# Patient Record
Sex: MALE | Race: WHITE | Employment: OTHER | URBAN - METROPOLITAN AREA
[De-identification: names, ages, dates, MRNs, and addresses within clinical notes are randomized per-mention and may not be internally consistent; named-entity substitution may affect disease eponyms.]

---

## 2020-01-07 ENCOUNTER — APPOINTMENT (OUTPATIENT)
Dept: GENERAL RADIOLOGY | Age: 60
End: 2020-01-07
Payer: MEDICARE

## 2020-01-07 ENCOUNTER — APPOINTMENT (OUTPATIENT)
Dept: CT IMAGING | Age: 60
End: 2020-01-07
Payer: MEDICARE

## 2020-01-07 ENCOUNTER — HOSPITAL ENCOUNTER (EMERGENCY)
Age: 60
Discharge: ANOTHER HOSPITAL | End: 2020-01-07
Attending: EMERGENCY MEDICINE
Payer: MEDICARE

## 2020-01-07 ENCOUNTER — HOSPITAL ENCOUNTER (EMERGENCY)
Dept: HOSPITAL 83 - ED | Age: 60
Discharge: ANOTHER HOSPITAL | End: 2020-01-07
Payer: MEDICARE

## 2020-01-07 VITALS — HEIGHT: 73.98 IN | WEIGHT: 208 LBS | BODY MASS INDEX: 26.69 KG/M2

## 2020-01-07 VITALS
HEART RATE: 82 BPM | OXYGEN SATURATION: 96 % | DIASTOLIC BLOOD PRESSURE: 74 MMHG | SYSTOLIC BLOOD PRESSURE: 120 MMHG | RESPIRATION RATE: 18 BRPM | HEIGHT: 74 IN | WEIGHT: 218 LBS | BODY MASS INDEX: 27.98 KG/M2 | TEMPERATURE: 99.1 F

## 2020-01-07 VITALS — DIASTOLIC BLOOD PRESSURE: 56 MMHG

## 2020-01-07 VITALS — DIASTOLIC BLOOD PRESSURE: 60 MMHG

## 2020-01-07 VITALS — SYSTOLIC BLOOD PRESSURE: 104 MMHG | DIASTOLIC BLOOD PRESSURE: 57 MMHG

## 2020-01-07 VITALS — DIASTOLIC BLOOD PRESSURE: 54 MMHG | SYSTOLIC BLOOD PRESSURE: 111 MMHG

## 2020-01-07 VITALS — DIASTOLIC BLOOD PRESSURE: 52 MMHG

## 2020-01-07 VITALS — DIASTOLIC BLOOD PRESSURE: 56 MMHG | SYSTOLIC BLOOD PRESSURE: 107 MMHG

## 2020-01-07 DIAGNOSIS — E78.5: ICD-10-CM

## 2020-01-07 DIAGNOSIS — F10.121: ICD-10-CM

## 2020-01-07 DIAGNOSIS — D64.9: ICD-10-CM

## 2020-01-07 DIAGNOSIS — D68.9: ICD-10-CM
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
## 2020-01-07 DIAGNOSIS — I10: ICD-10-CM

## 2020-01-07 DIAGNOSIS — K92.2: Primary | ICD-10-CM

## 2020-01-07 LAB
ABO/RH: NORMAL
ALBUMIN SERPL-MCNC: 2.4 GM/DL (ref 3.1–4.5)
ALBUMIN SERPL-MCNC: 2.9 G/DL (ref 3.5–5.2)
ALP BLD-CCNC: 69 U/L (ref 40–129)
ALP SERPL-CCNC: 86 U/L (ref 45–117)
ALT SERPL W P-5'-P-CCNC: 43 U/L (ref 12–78)
ALT SERPL-CCNC: 40 U/L (ref 0–40)
AMMONIA: 46 UMOL/L (ref 16–60)
ANION GAP SERPL CALCULATED.3IONS-SCNC: 12 MMOL/L (ref 7–16)
ANISOCYTOSIS: ABNORMAL
ANTIBODY SCREEN: NORMAL
APTT: 37.8 SEC (ref 24.5–35.1)
AST SERPL-CCNC: 111 IU/L (ref 3–35)
AST SERPL-CCNC: 130 U/L (ref 0–39)
BASO STIPL BLD QL SMEAR: SLIGHT
BASO STIPL BLD QL SMEAR: SLIGHT
BASOPHILS # BLD AUTO: 1 % (ref 0–1)
BASOPHILS # BLD AUTO: 2 % (ref 0–1)
BASOPHILS ABSOLUTE: 0 E9/L (ref 0–0.2)
BASOPHILS RELATIVE PERCENT: 0 % (ref 0–2)
BILIRUB SERPL-MCNC: 6.7 MG/DL (ref 0–1.2)
BILIRUBIN DIRECT: 2.3 MG/DL (ref 0–0.3)
BILIRUBIN, INDIRECT: 4.4 MG/DL (ref 0–1)
BLOOD BANK DISPENSE STATUS: NORMAL
BLOOD BANK PRODUCT CODE: NORMAL
BPU ID: NORMAL
BUN BLDV-MCNC: 56 MG/DL (ref 6–20)
BUN SERPL-MCNC: 57 MG/DL (ref 7–24)
CALCIUM SERPL-MCNC: 8.6 MG/DL (ref 8.6–10.2)
CHLORIDE BLD-SCNC: 98 MMOL/L (ref 98–107)
CHLORIDE SERPL-SCNC: 101 MMOL/L (ref 98–107)
CK SERPL-CCNC: 87 U/L (ref 39–308)
CO2: 23 MMOL/L (ref 22–29)
CREAT SERPL-MCNC: 2.2 MG/DL (ref 0.7–1.2)
CREAT SERPL-MCNC: 2.73 MG/DL (ref 0.7–1.3)
DESCRIPTION BLOOD BANK: NORMAL
EKG ATRIAL RATE: 86 BPM
EKG P AXIS: 69 DEGREES
EKG P-R INTERVAL: 188 MS
EKG Q-T INTERVAL: 382 MS
EKG QRS DURATION: 92 MS
EKG QTC CALCULATION (BAZETT): 457 MS
EKG R AXIS: 34 DEGREES
EKG T AXIS: 69 DEGREES
EKG VENTRICULAR RATE: 86 BPM
EOSINOPHILS ABSOLUTE: 0.17 E9/L (ref 0.05–0.5)
EOSINOPHILS RELATIVE PERCENT: 3 % (ref 0–6)
ERYTHROCYTE [DISTWIDTH] IN BLOOD BY AUTOMATED COUNT: 20 % (ref 0–14.5)
ERYTHROCYTE [DISTWIDTH] IN BLOOD BY AUTOMATED COUNT: 20 % (ref 0–14.5)
ETHANOL SERPL-MCNC: 277 MG/DL (ref ?–3)
ETHANOL: 209 MG/DL (ref 0–0.08)
GFR AFRICAN AMERICAN: 37
GFR NON-AFRICAN AMERICAN: 31 ML/MIN/1.73
GLUCOSE BLD-MCNC: 187 MG/DL (ref 74–99)
HCT VFR BLD AUTO: 12.7 % (ref 42–52)
HCT VFR BLD AUTO: 13.3 % (ref 42–52)
HCT VFR BLD CALC: 16.2 % (ref 37–54)
HEMOGLOBIN: 5.1 G/DL (ref 12.5–16.5)
HGB BLD-MCNC: 3.9 G/DL (ref 14–18)
HGB BLD-MCNC: 4.2 G/DL (ref 14–18)
HOWELL-JOLLY BOD BLD QL SMEAR: (no result)
HYPOCHROMIA: ABNORMAL
INR BLD: 2.1 (ref 2–3.5)
INR BLD: 2.4
LIPASE SERPL-CCNC: 1069 U/L (ref 73–393)
LYMPHOCYTES ABSOLUTE: 0.45 E9/L (ref 1.5–4)
LYMPHOCYTES RELATIVE PERCENT: 8 % (ref 20–42)
MACROCYTES BLD QL SMEAR: (no result)
MACROCYTES BLD QL SMEAR: (no result)
MCH RBC QN AUTO: 40.5 PG (ref 26–35)
MCH RBC QN AUTO: 44.3 PG (ref 27–31)
MCH RBC QN AUTO: 45.7 PG (ref 27–31)
MCHC RBC AUTO-ENTMCNC: 30.7 G/DL (ref 33–37)
MCHC RBC AUTO-ENTMCNC: 31.5 % (ref 32–34.5)
MCHC RBC AUTO-ENTMCNC: 31.6 G/DL (ref 33–37)
MCV RBC AUTO: 128.6 FL (ref 80–99.9)
MCV RBC AUTO: 144.3 FL (ref 80–94)
MCV RBC AUTO: 144.6 FL (ref 80–94)
METAMYELOCYTES RELATIVE PERCENT: 2 % (ref 0–1)
MONOCYTES ABSOLUTE: 0.39 E9/L (ref 0.1–0.95)
MONOCYTES RELATIVE PERCENT: 7 % (ref 2–12)
MYELOCYTE PERCENT: 1 % (ref 0–0)
MYOGLOBIN SERPL-MCNC: 285 NG/ML (ref 16–116)
NEUTROPHILS ABSOLUTE: 4.59 E9/L (ref 1.8–7.3)
NEUTROPHILS RELATIVE PERCENT: 79 % (ref 43–80)
NRBC BLD QL AUTO: 0.1 10*3/UL (ref 0–0)
NRBC BLD QL AUTO: 0.1 10*3/UL (ref 0–0)
NUCLEATED RED BLOOD CELLS: 1 /100 WBC
OVALOCYTES: ABNORMAL
PDW BLD-RTO: ABNORMAL FL (ref 11.5–15)
PLATELET # BLD AUTO: 79 10*3/UL (ref 130–400)
PLATELET # BLD AUTO: 88 10*3/UL (ref 130–400)
PLATELET # BLD: 73 E9/L (ref 130–450)
PLATELET CONFIRMATION: NORMAL
PLATELET SUFFICIENCY: (no result)
PLATELET SUFFICIENCY: (no result)
PMV BLD AUTO: 12.5 FL (ref 7–12)
PMV BLD AUTO: 12.7 FL (ref 9.6–12.3)
PMV BLD AUTO: 12.9 FL (ref 9.6–12.3)
POIKILOCYTES: ABNORMAL
POLYCHROMASIA BLD QL SMEAR: SLIGHT
POLYCHROMASIA BLD QL SMEAR: SLIGHT
POLYCHROMASIA: ABNORMAL
POTASSIUM SERPL-SCNC: 4.8 MMOL/L (ref 3.5–5.1)
POTASSIUM SERPL-SCNC: 5.9 MMOL/L (ref 3.5–5)
PROT SERPL-MCNC: 8.3 GM/DL (ref 6.4–8.2)
PROTHROMBIN TIME: 26.8 SEC (ref 9.3–12.4)
RBC # BLD AUTO: 0.88 10*6/UL (ref 4.5–5.9)
RBC # BLD AUTO: 0.92 10*6/UL (ref 4.5–5.9)
RBC # BLD: 1.26 E12/L (ref 3.8–5.8)
ROULEAUX BLD QL SMEAR: (no result)
ROULEAUX BLD QL SMEAR: (no result)
SODIUM BLD-SCNC: 133 MMOL/L (ref 132–146)
SODIUM SERPL-SCNC: 133 MMOL/L (ref 136–145)
TARGETS BLD QL SMEAR: (no result)
TARGETS BLD QL SMEAR: (no result)
TOTAL CELLS COUNTED: 100 #CELLS
TOTAL CELLS COUNTED: 100 #CELLS
TOTAL PROTEIN: 8.9 G/DL (ref 6.4–8.3)
TROPONIN I SERPL-MCNC: < 0.015 NG/ML (ref ?–0.04)
TROPONIN: 0.09 NG/ML (ref 0–0.03)
WBC # BLD: 5.6 E9/L (ref 4.5–11.5)
WBC NRBC COR # BLD AUTO: 6.8 10*3/UL (ref 4.8–10.8)
WBC NRBC COR # BLD AUTO: 7.7 10*3/UL (ref 4.8–10.8)

## 2020-01-07 PROCEDURE — 93010 ELECTROCARDIOGRAM REPORT: CPT | Performed by: INTERNAL MEDICINE

## 2020-01-07 PROCEDURE — 70450 CT HEAD/BRAIN W/O DYE: CPT

## 2020-01-07 PROCEDURE — 99222 1ST HOSP IP/OBS MODERATE 55: CPT | Performed by: SURGERY

## 2020-01-07 PROCEDURE — 6360000002 HC RX W HCPCS: Performed by: STUDENT IN AN ORGANIZED HEALTH CARE EDUCATION/TRAINING PROGRAM

## 2020-01-07 PROCEDURE — G0480 DRUG TEST DEF 1-7 CLASSES: HCPCS

## 2020-01-07 PROCEDURE — 86923 COMPATIBILITY TEST ELECTRIC: CPT

## 2020-01-07 PROCEDURE — 80048 BASIC METABOLIC PNL TOTAL CA: CPT

## 2020-01-07 PROCEDURE — C9113 INJ PANTOPRAZOLE SODIUM, VIA: HCPCS | Performed by: EMERGENCY MEDICINE

## 2020-01-07 PROCEDURE — 36430 TRANSFUSION BLD/BLD COMPNT: CPT

## 2020-01-07 PROCEDURE — 6360000002 HC RX W HCPCS: Performed by: EMERGENCY MEDICINE

## 2020-01-07 PROCEDURE — 72125 CT NECK SPINE W/O DYE: CPT

## 2020-01-07 PROCEDURE — 2580000003 HC RX 258: Performed by: EMERGENCY MEDICINE

## 2020-01-07 PROCEDURE — 85025 COMPLETE CBC W/AUTO DIFF WBC: CPT

## 2020-01-07 PROCEDURE — 36415 COLL VENOUS BLD VENIPUNCTURE: CPT

## 2020-01-07 PROCEDURE — 86901 BLOOD TYPING SEROLOGIC RH(D): CPT

## 2020-01-07 PROCEDURE — 2580000003 HC RX 258: Performed by: STUDENT IN AN ORGANIZED HEALTH CARE EDUCATION/TRAINING PROGRAM

## 2020-01-07 PROCEDURE — 71045 X-RAY EXAM CHEST 1 VIEW: CPT

## 2020-01-07 PROCEDURE — 6360000002 HC RX W HCPCS: Performed by: SURGERY

## 2020-01-07 PROCEDURE — 93005 ELECTROCARDIOGRAM TRACING: CPT | Performed by: EMERGENCY MEDICINE

## 2020-01-07 PROCEDURE — 86850 RBC ANTIBODY SCREEN: CPT

## 2020-01-07 PROCEDURE — 84484 ASSAY OF TROPONIN QUANT: CPT

## 2020-01-07 PROCEDURE — 86900 BLOOD TYPING SEROLOGIC ABO: CPT

## 2020-01-07 PROCEDURE — 85610 PROTHROMBIN TIME: CPT

## 2020-01-07 PROCEDURE — 96368 THER/DIAG CONCURRENT INF: CPT

## 2020-01-07 PROCEDURE — 80076 HEPATIC FUNCTION PANEL: CPT

## 2020-01-07 PROCEDURE — C9132 KCENTRA, PER I.U.: HCPCS | Performed by: SURGERY

## 2020-01-07 PROCEDURE — P9016 RBC LEUKOCYTES REDUCED: HCPCS

## 2020-01-07 PROCEDURE — 82140 ASSAY OF AMMONIA: CPT

## 2020-01-07 PROCEDURE — 85730 THROMBOPLASTIN TIME PARTIAL: CPT

## 2020-01-07 PROCEDURE — 99285 EMERGENCY DEPT VISIT HI MDM: CPT

## 2020-01-07 PROCEDURE — 2580000003 HC RX 258: Performed by: SURGERY

## 2020-01-07 PROCEDURE — 72170 X-RAY EXAM OF PELVIS: CPT

## 2020-01-07 PROCEDURE — 96375 TX/PRO/DX INJ NEW DRUG ADDON: CPT

## 2020-01-07 PROCEDURE — 96365 THER/PROPH/DIAG IV INF INIT: CPT

## 2020-01-07 PROCEDURE — 96367 TX/PROPH/DG ADDL SEQ IV INF: CPT

## 2020-01-07 RX ORDER — 0.9 % SODIUM CHLORIDE 0.9 %
250 INTRAVENOUS SOLUTION INTRAVENOUS ONCE
Status: COMPLETED | OUTPATIENT
Start: 2020-01-07 | End: 2020-01-07

## 2020-01-07 RX ORDER — CYANOCOBALAMIN 1000 UG/ML
1000 INJECTION INTRAMUSCULAR; SUBCUTANEOUS
COMMUNITY

## 2020-01-07 RX ORDER — LEVETIRACETAM 5 MG/ML
500 INJECTION INTRAVASCULAR EVERY 12 HOURS
Status: DISCONTINUED | OUTPATIENT
Start: 2020-01-07 | End: 2020-01-07 | Stop reason: HOSPADM

## 2020-01-07 RX ORDER — PANTOPRAZOLE SODIUM 40 MG/10ML
80 INJECTION, POWDER, LYOPHILIZED, FOR SOLUTION INTRAVENOUS ONCE
Status: COMPLETED | OUTPATIENT
Start: 2020-01-07 | End: 2020-01-07

## 2020-01-07 RX ORDER — FOLIC ACID 1 MG/1
1 TABLET ORAL DAILY
COMMUNITY

## 2020-01-07 RX ADMIN — PANTOPRAZOLE SODIUM 80 MG: 40 INJECTION, POWDER, FOR SOLUTION INTRAVENOUS at 10:47

## 2020-01-07 RX ADMIN — SODIUM CHLORIDE 250 ML: 9 INJECTION, SOLUTION INTRAVENOUS at 13:23

## 2020-01-07 RX ADMIN — PHYTONADIONE 10 MG: 10 INJECTION, EMULSION INTRAMUSCULAR; INTRAVENOUS; SUBCUTANEOUS at 14:24

## 2020-01-07 RX ADMIN — LEVETIRACETAM 500 MG: 5 INJECTION INTRAVENOUS at 13:18

## 2020-01-07 RX ADMIN — SODIUM CHLORIDE 250 ML: 9 INJECTION, SOLUTION INTRAVENOUS at 11:59

## 2020-01-07 RX ADMIN — PROTHROMBIN, COAGULATION FACTOR VII HUMAN, COAGULATION FACTOR IX HUMAN, COAGULATION FACTOR X HUMAN, PROTEIN C, PROTEIN S HUMAN, AND WATER 2500 UNITS: KIT at 14:24

## 2020-01-07 NOTE — ED PROVIDER NOTES
MAKING----------------------  Medications   pantoprazole (PROTONIX) injection 80 mg (80 mg Intravenous Given 1/7/20 1047)   0.9 % sodium chloride bolus (0 mLs Intravenous Stopped 1/7/20 1421)   0.9 % sodium chloride bolus (0 mLs Intravenous Stopped 1/7/20 1556)   prothrombin complex concentrate (human) (KCENTRA) infusion 2,500 Units (0 Units Intravenous Stopped 1/7/20 1515)   phytonadione (ADULT) (VITAMIN K) 10 mg in dextrose 5 % 100 mL IVPB (0 mg Intravenous Stopped 1/7/20 1514)              Medical Decision Making:   Transferred for SDH but appears to be in liver failure. Profound anemia, transfusion ordered, trauma consulted, trauma evaluated, additional meds ordered, trauma recommended transfer to transplant center for further evaluation      Re-Evaluations:       No change      This patient's ED course included: a personal history and physicial examination, re-evaluation prior to disposition, multiple bedside re-evaluations, IV medications, cardiac monitoring, continuous pulse oximetry and complex medical decision making and emergency management    This patient has remained hemodynamically stable during their ED course. Consultations:  Trauma surgery    CRITICAL CARE:  Please note that the withdrawal or failure to initiate urgent interventions for this patient would likely result in a life threatening deterioration or permanent disability. Accordingly this patient received 30 minutes of critical care time, excluding separately billable procedures. Counseling: The emergency provider has spoken with the patient and discussed todays results, in addition to providing specific details for the plan of care and counseling regarding the diagnosis and prognosis. Questions are answered at this time and they are agreeable with the plan.       --------------------------------- IMPRESSION AND DISPOSITION ---------------------------------    IMPRESSION  1. Subdural hematoma ([address]    2.  Alcoholic liver failure (HCC)    3. Coagulopathy (HCC)    4. Other ascites    5. Anemia, unspecified type    6. Acute alcoholic intoxication with complication ([address]        DISPOSITION  Disposition: Transfer  Patient condition is stable        NOTE: This report was transcribed using voice recognition software.  Every effort was made to ensure accuracy; however, inadvertent computerized transcription errors may be present        Bebe Smith MD  01/07/20 0449

## 2020-01-07 NOTE — ED NOTES
Nurse to nurse report called to [facility]6 NEIDA Lange, RN  01/07/20 7397

## 2020-01-07 NOTE — H&P
TRAUMA HISTORY & PHYSICAL  Surgical Resident/Advance Practice Nurse  1/7/2020  10:48 AM    PRIMARY SURVEY    CHIEF COMPLAINT:  Subdural, low hemoglobin    Injury occurred this AM: Patient is a 64 YO male that appears older than stated age. Patient is a transfer from [facility] with the chief complaint of fall. Patient states that this morning he was in his recliner and was trying to slide out of his recliner, he subsequently fell and is unaware whether he hit his head or not. Patient endorses drinking and states he drinks all day (750 ml of vodka x many years), his last drink was right before the ambulance arrived. Patient states that he falls often. Patient denies any new onset numbness or tingling currently. He has pain from previous back surgeries for which he had a pain pump implanted. Per his wife, she states that he has fallen many, many times. For the last few days the patient has had to crawl to and from the bathroom, where as normally he uses a walker and is able to ambulate independently with the walker as assistance.     MELD of 32    AIRWAY:   Airway Normal  EMS ETT Absent  Noisy respirations Absent  Retractions: Absent  Vomiting/bleeding: Absent    BREATHING:    Midaxillary breath sound left:  Normal  Midaxillary breath sound right:  Normal    Cough sound intensity:  N/A  FiO2: 4 liters/min via nasal cannula saturating at 92%  SMI: N/A    CIRCULATION:   Femerol pulse intensity: Strong  Palpebral conjunctiva: Pink       Vitals:    01/07/20 1034   BP:    Pulse:    Resp:    Temp:    SpO2: (!) 89%       Vitals:    01/07/20 1009 01/07/20 1014 01/07/20 1034   BP:  122/71    Pulse:  87    Resp: 18     Temp: 98.1 °F (36.7 °C)     TempSrc: Oral     SpO2:  92% (!) 89%   Weight: 218 lb (98.9 kg)     Height: 6' 2\" (1.88 m)          FAST EXAM: + for free ascitic fluid    Central Nervous System    GCS Initial 15 minutes   Eye  Motor  Verbal 4 - Opens eyes on own  6 - Follows simple motor

## 2020-01-07 NOTE — ED NOTES
Spoke with blood bank to confirm 1 hour wait time post transfusion to draw blood     Rosaland Hashimoto, RN  01/07/20 3792

## 2020-01-07 NOTE — NUR
FIRST UNIT OF PRBC'S STARTED. UNABLE TO SCAN ON TAR AS IT IS CURRENTLY DOWN
ACCORDING TO BLOOD BANK.

## 2020-01-29 ENCOUNTER — HOSPITAL ENCOUNTER (EMERGENCY)
Age: 60
Discharge: HOME | End: 2020-01-29
Attending: EMERGENCY MEDICINE
Payer: MEDICARE

## 2020-01-29 VITALS
OXYGEN SATURATION: 99 % | TEMPERATURE: 98.3 F | BODY MASS INDEX: 23.74 KG/M2 | WEIGHT: 185 LBS | HEART RATE: 70 BPM | SYSTOLIC BLOOD PRESSURE: 120 MMHG | RESPIRATION RATE: 14 BRPM | HEIGHT: 74 IN | DIASTOLIC BLOOD PRESSURE: 70 MMHG

## 2020-01-29 LAB
ABO/RH: NORMAL
ACANTHOCYTES: ABNORMAL
ALBUMIN SERPL-MCNC: 3.3 G/DL (ref 3.5–5.2)
ALP BLD-CCNC: 88 U/L (ref 40–129)
ALT SERPL-CCNC: 55 U/L (ref 0–40)
ANION GAP SERPL CALCULATED.3IONS-SCNC: 10 MMOL/L (ref 7–16)
ANISOCYTOSIS: ABNORMAL
ANTIBODY SCREEN: NORMAL
AST SERPL-CCNC: 110 U/L (ref 0–39)
ATYPICAL LYMPHOCYTE RELATIVE PERCENT: 0.9 % (ref 0–4)
BASOPHILS ABSOLUTE: 0 E9/L (ref 0–0.2)
BASOPHILS RELATIVE PERCENT: 0 % (ref 0–2)
BILIRUB SERPL-MCNC: 4.1 MG/DL (ref 0–1.2)
BILIRUBIN DIRECT: 1.7 MG/DL (ref 0–0.3)
BILIRUBIN, INDIRECT: 2.4 MG/DL (ref 0–1)
BUN BLDV-MCNC: 19 MG/DL (ref 6–20)
BURR CELLS: ABNORMAL
CALCIUM SERPL-MCNC: 8.8 MG/DL (ref 8.6–10.2)
CHLORIDE BLD-SCNC: 94 MMOL/L (ref 98–107)
CO2: 24 MMOL/L (ref 22–29)
CREAT SERPL-MCNC: 1.2 MG/DL (ref 0.7–1.2)
EOSINOPHILS ABSOLUTE: 0.28 E9/L (ref 0.05–0.5)
EOSINOPHILS RELATIVE PERCENT: 4.3 % (ref 0–6)
GFR AFRICAN AMERICAN: >60
GFR NON-AFRICAN AMERICAN: >60 ML/MIN/1.73
GLUCOSE BLD-MCNC: 110 MG/DL (ref 74–99)
HCT VFR BLD CALC: 22.8 % (ref 37–54)
HEMOGLOBIN: 7.4 G/DL (ref 12.5–16.5)
HOWELL-JOLLY BODIES: ABNORMAL
INR BLD: 2.2
LIPASE: 313 U/L (ref 13–60)
LYMPHOCYTES ABSOLUTE: 1.09 E9/L (ref 1.5–4)
LYMPHOCYTES RELATIVE PERCENT: 15.6 % (ref 20–42)
MCH RBC QN AUTO: 33.2 PG (ref 26–35)
MCHC RBC AUTO-ENTMCNC: 32.5 % (ref 32–34.5)
MCV RBC AUTO: 102.2 FL (ref 80–99.9)
MONOCYTES ABSOLUTE: 0.45 E9/L (ref 0.1–0.95)
MONOCYTES RELATIVE PERCENT: 7 % (ref 2–12)
NEUTROPHILS ABSOLUTE: 4.61 E9/L (ref 1.8–7.3)
NEUTROPHILS RELATIVE PERCENT: 72.2 % (ref 43–80)
NUCLEATED RED BLOOD CELLS: 0 /100 WBC
PDW BLD-RTO: 23.4 FL (ref 11.5–15)
PLATELET # BLD: 134 E9/L (ref 130–450)
PMV BLD AUTO: 13.2 FL (ref 7–12)
POIKILOCYTES: ABNORMAL
POTASSIUM REFLEX MAGNESIUM: 5.1 MMOL/L (ref 3.5–5)
PROTHROMBIN TIME: 26.1 SEC (ref 9.3–12.4)
RBC # BLD: 2.23 E12/L (ref 3.8–5.8)
SCHISTOCYTES: ABNORMAL
SODIUM BLD-SCNC: 128 MMOL/L (ref 132–146)
TARGET CELLS: ABNORMAL
TOTAL PROTEIN: 8.8 G/DL (ref 6.4–8.3)
WBC # BLD: 6.4 E9/L (ref 4.5–11.5)

## 2020-01-29 PROCEDURE — 86900 BLOOD TYPING SEROLOGIC ABO: CPT

## 2020-01-29 PROCEDURE — 85025 COMPLETE CBC W/AUTO DIFF WBC: CPT

## 2020-01-29 PROCEDURE — 99283 EMERGENCY DEPT VISIT LOW MDM: CPT

## 2020-01-29 PROCEDURE — 83690 ASSAY OF LIPASE: CPT

## 2020-01-29 PROCEDURE — 80076 HEPATIC FUNCTION PANEL: CPT

## 2020-01-29 PROCEDURE — 80048 BASIC METABOLIC PNL TOTAL CA: CPT

## 2020-01-29 PROCEDURE — 86850 RBC ANTIBODY SCREEN: CPT

## 2020-01-29 PROCEDURE — 85610 PROTHROMBIN TIME: CPT

## 2020-01-29 PROCEDURE — 86901 BLOOD TYPING SEROLOGIC RH(D): CPT

## 2020-01-29 NOTE — ED PROVIDER NOTES
Patient is a 50-year-old male, with a past medical history of alcohol abuse, Warnicke encephalopathy, recent admission for subdural hematoma earlier this month who presents from [address] [place] [address] for anemia. Patient had outpatient lab work drawn today is found to have a hemoglobin of approximately 6.7. He sent to the emergency department for blood transfusion. Patient denies any associated symptoms with this. He denies any lightheadedness, vision changes, hearing changes, shortness of breath or chest pain or palpitations. Denies any generalized weakness or fatigue. He states he believes he has had blood transfusions in the past.  He is somewhat of a poor historian in regards to his medical history but seems to recall recent events well. Denies any bleeding episodes or blood or melena in his stool or stool color changes. Denies pruritis. The history is provided by the patient and medical records. Other   This is a new problem. The current episode started 12 to 24 hours ago. The problem occurs constantly. The problem has not changed since onset. Pertinent negatives include no chest pain, no abdominal pain, no headaches and no shortness of breath. Nothing aggravates the symptoms. Nothing relieves the symptoms. He has tried nothing for the symptoms. Review of Systems   Constitutional: Negative for chills, diaphoresis and fever. HENT: Negative for congestion, ear pain, rhinorrhea, sinus pressure and sore throat. Eyes: Negative for pain, redness and visual disturbance. Respiratory: Negative for cough, shortness of breath and wheezing. Cardiovascular: Negative for chest pain, palpitations and leg swelling. Gastrointestinal: Negative for abdominal pain, diarrhea, nausea and vomiting. Genitourinary: Negative for dysuria, frequency and hematuria. Musculoskeletal: Negative for arthralgias and myalgias. Skin: Positive for color change. Negative for rash.    Neurological: Negative for syncope, light-headedness, numbness and headaches. Hematological: Negative for adenopathy. Does not bruise/bleed easily. Physical Exam  Vitals signs and nursing note reviewed. Constitutional:       General: He is not in acute distress. Appearance: Normal appearance. He is well-developed. He is not diaphoretic. HENT:      Head: Normocephalic and atraumatic. Mouth/Throat:      Pharynx: No oropharyngeal exudate. Eyes:      General: Scleral icterus present. Right eye: No discharge. Left eye: No discharge. Conjunctiva/sclera: Conjunctivae normal.      Pupils: Pupils are equal, round, and reactive to light. Neck:      Musculoskeletal: Normal range of motion and neck supple. Thyroid: No thyromegaly. Cardiovascular:      Rate and Rhythm: Normal rate and regular rhythm. Pulses: Normal pulses. Heart sounds: Normal heart sounds. No murmur. No friction rub. No gallop. Pulmonary:      Effort: Pulmonary effort is normal. No respiratory distress. Breath sounds: Normal breath sounds. No wheezing or rales. Abdominal:      General: Abdomen is flat. Bowel sounds are normal. There is no distension or abdominal bruit. Palpations: Abdomen is soft. There is no mass. Tenderness: There is no abdominal tenderness. There is no right CVA tenderness, left CVA tenderness, guarding or rebound. Hernia: No hernia is present. Musculoskeletal: Normal range of motion. Lymphadenopathy:      Cervical: No cervical adenopathy. Skin:     General: Skin is warm and dry. Capillary Refill: Capillary refill takes less than 2 seconds. Coloration: Skin is jaundiced. Skin is not pale. Findings: No erythema or rash. Neurological:      Mental Status: He is alert and oriented to person, place, and time.       Comments: No asterixis          Procedures     MDM     ED Course as of Jan 29 1758   Wed Jan 29, 2020   1735 ATTENDING PROVIDER ATTESTATION:     I have personally performed and/or participated in the history, exam, medical decision making, and procedures and agree with all pertinent clinical information unless otherwise noted. I have also reviewed and agree with the past medical, family and social history unless otherwise noted. I have discussed this patient in detail with the resident and provided the instruction and education regarding the evidence-based evaluation and treatment of anemia. History: This patient was sent here from local nursing facility for \"2 units packed red blood cells transfusion. \"  This is despite the fact that the patient has [place] no symptoms. My findings: Sanjay Stanley is a 63 y.o. male whom is in no distress. Physical exam reveals slight scar icterus is present. Remainder of his examination is normal.    My plan: Symptomatic and supportive care. Labs. Discussed with the patient that he would have to be transfusion protocol requirements. He does understand. Electronically signed by Karan Chavez DO on 1/29/20 at 5:35 PM          [TG]      ED Course User Index  [TG] Karan Chavez DO        Hgb above typical transfusion threshold in setting of patient with no symptoms of anemia. No ongoing blood loss. Transfusing the patient would put him at unnecessary risk of harm. Patients LFTs are elevated but improving from prior. He has previously documented liver failure related to EtOH abuse. No reason to suspect hemolysis given this improvement. He is feeling well and has no complaints. He will discharged back to rehab facility. Patient is agreeable with plan for discharge.    --------------------------------------------- PAST HISTORY ---------------------------------------------  Past Medical History:  has a past medical history of Diabetes mellitus ([address] History of blood transfusion, Hyperlipidemia, and Hypertension. Past Surgical History:  has a past surgical history that includes back surgery (2014);  Elbow surgery (2014); other surgical history (6 16 16); hernia repair; and other surgical history (N/A, 10/10/2016). Social History:  reports that he has quit smoking. He quit after 4.00 years of use. His smokeless tobacco use includes chew. He reports current alcohol use. He reports that he does not use drugs. Family History: family history is not on file. The patients home medications have been reviewed. Allergies: Patient has no known allergies.     -------------------------------------------------- RESULTS -------------------------------------------------  Labs:  Results for orders placed or performed during the hospital encounter of 01/29/20   CBC Auto Differential   Result Value Ref Range    WBC 6.4 4.5 - 11.5 E9/L    RBC 2.23 (L) 3.80 - 5.80 E12/L    Hemoglobin 7.4 (L) 12.5 - 16.5 g/dL    Hematocrit 22.8 (L) 37.0 - 54.0 %    .2 (H) 80.0 - 99.9 fL    MCH 33.2 26.0 - 35.0 pg    MCHC 32.5 32.0 - 34.5 %    RDW 23.4 (H) 11.5 - 15.0 fL    Platelets 372 273 - 539 E9/L    MPV 13.2 (H) 7.0 - 12.0 fL    Neutrophils % 72.2 43.0 - 80.0 %    Lymphocytes % 15.6 (L) 20.0 - 42.0 %    Monocytes % 7.0 2.0 - 12.0 %    Eosinophils % 4.3 0.0 - 6.0 %    Basophils % 0.0 0.0 - 2.0 %    Neutrophils Absolute 4.61 1.80 - 7.30 E9/L    Lymphocytes Absolute 1.09 (L) 1.50 - 4.00 E9/L    Monocytes Absolute 0.45 0.10 - 0.95 E9/L    Eosinophils Absolute 0.28 0.05 - 0.50 E9/L    Basophils Absolute 0.00 0.00 - 0.20 E9/L    Atypical Lymphocytes Relative 0.9 0.0 - 4.0 %    nRBC 0.0 /100 WBC    Anisocytosis 3+     Poikilocytes 3+     Schistocytes 1+     Acanthocytes 1+     Margo Cells 3+     Target Cells 1+     Massey-Jolly Bodies 1+    Basic Metabolic Panel w/ Reflex to MG   Result Value Ref Range    Sodium 128 (L) 132 - 146 mmol/L    Potassium reflex Magnesium 5.1 (H) 3.5 - 5.0 mmol/L    Chloride 94 (L) 98 - 107 mmol/L    CO2 24 22 - 29 mmol/L    Anion Gap 10 7 - 16 mmol/L    Glucose 110 (H) 74 - 99 mg/dL    BUN 19 6 - 20 mg/dL CREATININE 1.2 0.7 - 1.2 mg/dL    GFR Non-African American >60 >=60 mL/min/1.73    GFR African American >60     Calcium 8.8 8.6 - 10.2 mg/dL   Hepatic Function Panel   Result Value Ref Range    Total Protein 8.8 (H) 6.4 - 8.3 g/dL    Alb 3.3 (L) 3.5 - 5.2 g/dL    Alkaline Phosphatase 88 40 - 129 U/L    ALT 55 (H) 0 - 40 U/L     (H) 0 - 39 U/L    Total Bilirubin 4.1 (H) 0.0 - 1.2 mg/dL    Bilirubin, Direct 1.7 (H) 0.0 - 0.3 mg/dL    Bilirubin, Indirect 2.4 (H) 0.0 - 1.0 mg/dL   Lipase   Result Value Ref Range    Lipase 313 (H) 13 - 60 U/L   Protime-INR   Result Value Ref Range    Protime 26.1 (H) 9.3 - 12.4 sec    INR 2.2    TYPE AND SCREEN   Result Value Ref Range    ABO/Rh A POS     Antibody Screen NEG        Radiology:  No orders to display       ------------------------- NURSING NOTES AND VITALS REVIEWED ---------------------------  Date / Time Roomed:  1/29/2020  4:08 PM  ED Bed Assignment:  02/02    The nursing notes within the ED encounter and vital signs as below have been reviewed. /70   Pulse 70   Temp 98.3 °F (36.8 °C) (Oral)   Resp 14   Ht 6' 2\" (1.88 m)   Wt 185 lb (83.9 kg)   SpO2 99%   BMI 23.75 kg/m²   Oxygen Saturation Interpretation: Normal      ------------------------------------------ PROGRESS NOTES ------------------------------------------  4:28 PM  I have spoken with the patient and discussed todays results, in addition to providing specific details for the plan of care and counseling regarding the diagnosis and prognosis. Their questions are answered at this time and they are agreeable with the plan. I discussed at length with them reasons for immediate return here for re evaluation. They will followup with their primary care physician by calling their office tomorrow.       --------------------------------- ADDITIONAL PROVIDER NOTES ---------------------------------  At this time the patient is without objective evidence of an acute process requiring hospitalization or inpatient management. They have remained hemodynamically stable throughout their entire ED visit and are stable for discharge with outpatient follow-up. The plan has been discussed in detail and they are aware of the specific conditions for emergent return, as well as the importance of follow-up. Discharge Medication List as of 1/29/2020  6:44 PM          Diagnosis:  1. Macrocytic anemia    2. Chronic liver failure without hepatic coma (HCC)        Disposition:  Patient's disposition: Discharge to nursing home  Patient's condition is stable.        Laly Trejo DO  Resident  01/30/20 [id number]

## 2020-01-30 ASSESSMENT — ENCOUNTER SYMPTOMS
COUGH: 0
EYE REDNESS: 0
NAUSEA: 0
DIARRHEA: 0
RHINORRHEA: 0
SHORTNESS OF BREATH: 0
SORE THROAT: 0
ABDOMINAL PAIN: 0
WHEEZING: 0
COLOR CHANGE: 1
SINUS PRESSURE: 0
VOMITING: 0
EYE PAIN: 0

## 2020-03-08 ENCOUNTER — HOSPITAL ENCOUNTER (INPATIENT)
Dept: HOSPITAL 83 - ED | Age: 60
LOS: 2 days | Discharge: HOME HEALTH | DRG: 811 | End: 2020-03-10
Attending: EMERGENCY MEDICINE | Admitting: EMERGENCY MEDICINE
Payer: MEDICARE

## 2020-03-08 VITALS — SYSTOLIC BLOOD PRESSURE: 119 MMHG | DIASTOLIC BLOOD PRESSURE: 65 MMHG

## 2020-03-08 VITALS — DIASTOLIC BLOOD PRESSURE: 59 MMHG

## 2020-03-08 VITALS — BODY MASS INDEX: 21.83 KG/M2 | HEIGHT: 73.98 IN | WEIGHT: 170.06 LBS

## 2020-03-08 VITALS — DIASTOLIC BLOOD PRESSURE: 72 MMHG | SYSTOLIC BLOOD PRESSURE: 120 MMHG

## 2020-03-08 VITALS — DIASTOLIC BLOOD PRESSURE: 72 MMHG

## 2020-03-08 VITALS — DIASTOLIC BLOOD PRESSURE: 66 MMHG

## 2020-03-08 VITALS — DIASTOLIC BLOOD PRESSURE: 68 MMHG | SYSTOLIC BLOOD PRESSURE: 113 MMHG

## 2020-03-08 VITALS — DIASTOLIC BLOOD PRESSURE: 69 MMHG

## 2020-03-08 VITALS — DIASTOLIC BLOOD PRESSURE: 110 MMHG

## 2020-03-08 VITALS — DIASTOLIC BLOOD PRESSURE: 63 MMHG

## 2020-03-08 VITALS — DIASTOLIC BLOOD PRESSURE: 73 MMHG

## 2020-03-08 VITALS — SYSTOLIC BLOOD PRESSURE: 114 MMHG | DIASTOLIC BLOOD PRESSURE: 67 MMHG

## 2020-03-08 VITALS — SYSTOLIC BLOOD PRESSURE: 119 MMHG | DIASTOLIC BLOOD PRESSURE: 72 MMHG

## 2020-03-08 VITALS — SYSTOLIC BLOOD PRESSURE: 114 MMHG | DIASTOLIC BLOOD PRESSURE: 62 MMHG

## 2020-03-08 VITALS — DIASTOLIC BLOOD PRESSURE: 68 MMHG | SYSTOLIC BLOOD PRESSURE: 123 MMHG

## 2020-03-08 VITALS — DIASTOLIC BLOOD PRESSURE: 67 MMHG | SYSTOLIC BLOOD PRESSURE: 116 MMHG

## 2020-03-08 VITALS — DIASTOLIC BLOOD PRESSURE: 64 MMHG

## 2020-03-08 VITALS — DIASTOLIC BLOOD PRESSURE: 67 MMHG

## 2020-03-08 DIAGNOSIS — D68.9: ICD-10-CM

## 2020-03-08 DIAGNOSIS — D72.810: ICD-10-CM

## 2020-03-08 DIAGNOSIS — E72.20: ICD-10-CM

## 2020-03-08 DIAGNOSIS — Z82.49: ICD-10-CM

## 2020-03-08 DIAGNOSIS — D64.9: Primary | ICD-10-CM

## 2020-03-08 DIAGNOSIS — D69.6: ICD-10-CM

## 2020-03-08 DIAGNOSIS — E80.6: ICD-10-CM

## 2020-03-08 DIAGNOSIS — Z79.899: ICD-10-CM

## 2020-03-08 DIAGNOSIS — Z83.3: ICD-10-CM

## 2020-03-08 DIAGNOSIS — K72.00: ICD-10-CM

## 2020-03-08 DIAGNOSIS — Z80.1: ICD-10-CM

## 2020-03-08 LAB
ALBUMIN SERPL-MCNC: 2.5 GM/DL (ref 3.1–4.5)
ALP SERPL-CCNC: 143 U/L (ref 45–117)
ALT SERPL W P-5'-P-CCNC: 78 U/L (ref 12–78)
APTT PPP: 32 SECONDS (ref 20–32.1)
AST SERPL-CCNC: 94 IU/L (ref 3–35)
BUN SERPL-MCNC: 38 MG/DL (ref 7–24)
CHLORIDE SERPL-SCNC: 104 MMOL/L (ref 98–107)
CREAT SERPL-MCNC: 2.04 MG/DL (ref 0.7–1.3)
ERYTHROCYTE [DISTWIDTH] IN BLOOD BY AUTOMATED COUNT: 16.2 % (ref 0–14.5)
HCT VFR BLD AUTO: 19.7 % (ref 42–52)
HGB BLD-MCNC: 6.4 G/DL (ref 14–18)
INR BLD: 1.7 (ref 2–3.5)
MCH RBC QN AUTO: 35.2 PG (ref 27–31)
MCHC RBC AUTO-ENTMCNC: 32.5 G/DL (ref 33–37)
MCV RBC AUTO: 108.2 FL (ref 80–94)
NRBC BLD QL AUTO: 0 % (ref 0–0)
OVALOCYTES BLD QL SMEAR: (no result)
PLATELET # BLD AUTO: 104 10*3/UL (ref 130–400)
PLATELET SUFFICIENCY: NORMAL
PMV BLD AUTO: 11.5 FL (ref 9.6–12.3)
POTASSIUM SERPL-SCNC: 4.6 MMOL/L (ref 3.5–5.1)
PROT SERPL-MCNC: 8.5 GM/DL (ref 6.4–8.2)
RBC # BLD AUTO: 1.82 10*6/UL (ref 4.5–5.9)
SCHISTOCYTES BLD QL SMEAR: (no result)
SODIUM SERPL-SCNC: 138 MMOL/L (ref 136–145)
TOTAL CELLS COUNTED: 100 #CELLS
TROPONIN I SERPL-MCNC: < 0.015 NG/ML (ref ?–0.04)
WBC NRBC COR # BLD AUTO: 6.6 10*3/UL (ref 4.8–10.8)

## 2020-03-08 PROCEDURE — 30233N1 TRANSFUSION OF NONAUTOLOGOUS RED BLOOD CELLS INTO PERIPHERAL VEIN, PERCUTANEOUS APPROACH: ICD-10-PCS | Performed by: STUDENT IN AN ORGANIZED HEALTH CARE EDUCATION/TRAINING PROGRAM

## 2020-03-08 NOTE — NUR
A 59, admitted to 4E, under the
services of JAVAN Berumen DO with a diagnosis of HYPERAMMONEMIA.
Chief complaint is ANEMIA.
Patient arrived via stretcher from ER.
Monitor applied. Initial assessment completed.
Vital signs taken and recorded.
JAVAN BERUMEN DO notified of admission to the unit.
Orders received.
See assessment for past medical history, medications
and allergies.
Patient and/or family oriented to unit.
Clothing/patient valuable form completed.
 
BILL DENTON

## 2020-03-09 VITALS — DIASTOLIC BLOOD PRESSURE: 58 MMHG | SYSTOLIC BLOOD PRESSURE: 110 MMHG

## 2020-03-09 VITALS — DIASTOLIC BLOOD PRESSURE: 49 MMHG | SYSTOLIC BLOOD PRESSURE: 111 MMHG

## 2020-03-09 VITALS — SYSTOLIC BLOOD PRESSURE: 106 MMHG | DIASTOLIC BLOOD PRESSURE: 60 MMHG

## 2020-03-09 VITALS — SYSTOLIC BLOOD PRESSURE: 114 MMHG | DIASTOLIC BLOOD PRESSURE: 65 MMHG

## 2020-03-09 VITALS — DIASTOLIC BLOOD PRESSURE: 68 MMHG

## 2020-03-09 VITALS — DIASTOLIC BLOOD PRESSURE: 69 MMHG

## 2020-03-09 LAB
25(OH)D3 SERPL-MCNC: 32.5 NG/ML (ref 30–100)
ALBUMIN SERPL-MCNC: 2.1 GM/DL (ref 3.1–4.5)
ALP SERPL-CCNC: 110 U/L (ref 45–117)
ALT SERPL W P-5'-P-CCNC: 67 U/L (ref 12–78)
APTT PPP: 33.9 SECONDS (ref 20–32.1)
AST SERPL-CCNC: 83 IU/L (ref 3–35)
BASOPHILS # BLD AUTO: 0.1 10*3/UL (ref 0–0.1)
BASOPHILS NFR BLD AUTO: 0.8 % (ref 0–1)
BUN SERPL-MCNC: 39 MG/DL (ref 7–24)
CHLORIDE SERPL-SCNC: 105 MMOL/L (ref 98–107)
CHOLEST SERPL-MCNC: 145 MG/DL (ref ?–200)
CREAT SERPL-MCNC: 2.09 MG/DL (ref 0.7–1.3)
EOSINOPHIL # BLD AUTO: 0.6 10*3/UL (ref 0–0.4)
EOSINOPHIL # BLD AUTO: 8.4 % (ref 1–4)
ERYTHROCYTE [DISTWIDTH] IN BLOOD BY AUTOMATED COUNT: 20.5 % (ref 0–14.5)
HCT VFR BLD AUTO: 25 % (ref 42–52)
HDLC SERPL-MCNC: 55 MG/DL (ref 40–60)
HGB BLD-MCNC: 8.3 G/DL (ref 14–18)
INR BLD: 1.8 (ref 2–3.5)
LDLC SERPL DIRECT ASSAY-MCNC: 71 MG/DL (ref 9–159)
LYMPHOCYTES # BLD AUTO: 0.9 10*3/UL (ref 1.3–4.4)
LYMPHOCYTES NFR BLD AUTO: 12.2 % (ref 27–41)
MCH RBC QN AUTO: 33.2 PG (ref 27–31)
MCHC RBC AUTO-ENTMCNC: 33.2 G/DL (ref 33–37)
MCV RBC AUTO: 100 FL (ref 80–94)
MONOCYTES # BLD AUTO: 0.5 10*3/UL (ref 0.1–1)
MONOCYTES NFR BLD MANUAL: 7.1 % (ref 3–9)
NEUT #: 5.2 10*3/UL (ref 2.3–7.9)
NEUT %: 71.1 % (ref 47–73)
NRBC BLD QL AUTO: 0 % (ref 0–0)
PHOSPHATE SERPL-MCNC: 4.8 MG/DL (ref 2.5–4.9)
PLATELET # BLD AUTO: 87 10*3/UL (ref 130–400)
PMV BLD AUTO: 11.9 FL (ref 9.6–12.3)
POTASSIUM SERPL-SCNC: 4.6 MMOL/L (ref 3.5–5.1)
PROT SERPL-MCNC: 7.6 GM/DL (ref 6.4–8.2)
RBC # BLD AUTO: 2.5 10*6/UL (ref 4.5–5.9)
SODIUM SERPL-SCNC: 137 MMOL/L (ref 136–145)
T4 FREE SERPL-MCNC: 1.25 NG/DL (ref 0.76–1.46)
TRIGL SERPL-MCNC: 93 MG/DL (ref ?–150)
TSH SERPL DL<=0.005 MIU/L-ACNC: 0.93 UIU/ML (ref 0.36–4.75)
VITAMIN B12: > 2000 PG/ML (ref 247–911)
VLDLC SERPL CALC-MCNC: 19 MG/DL (ref 6–40)
WBC NRBC COR # BLD AUTO: 7.3 10*3/UL (ref 4.8–10.8)

## 2020-03-09 NOTE — NUR
in to talk to patient.
Patient states lives at home with wife.
There are no steps in the home.
Physician: uriel cintron
Pharmacy: [facility]
Lead health services: none
Patient's level of ADLs: INDEPENDENT
Patient has working utilities: all working
DME: none
Follow-up physician's appointment after d/c: will be made by  hospitalist nurse
director upon discharge
Does patient want to access PORTAL?: no
Discharge plan discussed with patient, he states she lives at home with wife,
he is independent in adls and ambulation, he stated he will return home when
medically stable and denies any home needs, case management will follow
.
 
KOSTA FIELDS

## 2020-03-10 VITALS — DIASTOLIC BLOOD PRESSURE: 68 MMHG

## 2020-03-10 LAB
ACANTHOCYTES BLD QL SMEAR: (no result)
ALBUMIN SERPL-MCNC: 2.2 GM/DL (ref 3.1–4.5)
ALP SERPL-CCNC: 114 U/L (ref 45–117)
ALT SERPL W P-5'-P-CCNC: 71 U/L (ref 12–78)
AST SERPL-CCNC: 87 IU/L (ref 3–35)
BASOPHILS # BLD AUTO: 1 % (ref 0–1)
BUN SERPL-MCNC: 37 MG/DL (ref 7–24)
CHLORIDE SERPL-SCNC: 102 MMOL/L (ref 98–107)
CREAT SERPL-MCNC: 1.88 MG/DL (ref 0.7–1.3)
ERYTHROCYTE [DISTWIDTH] IN BLOOD BY AUTOMATED COUNT: 19.9 % (ref 0–14.5)
HCT VFR BLD AUTO: 24.6 % (ref 42–52)
HGB BLD-MCNC: 8.3 G/DL (ref 14–18)
MACROCYTES BLD QL SMEAR: SLIGHT
MCH RBC QN AUTO: 34 PG (ref 27–31)
MCHC RBC AUTO-ENTMCNC: 33.7 G/DL (ref 33–37)
MCV RBC AUTO: 100.8 FL (ref 80–94)
NRBC BLD QL AUTO: 0 10*3/UL (ref 0–0)
PLATELET # BLD AUTO: 83 10*3/UL (ref 130–400)
PLATELET SUFFICIENCY: (no result)
PMV BLD AUTO: 12 FL (ref 9.6–12.3)
POTASSIUM SERPL-SCNC: 3.8 MMOL/L (ref 3.5–5.1)
PROT SERPL-MCNC: 7.9 GM/DL (ref 6.4–8.2)
RBC # BLD AUTO: 2.44 10*6/UL (ref 4.5–5.9)
SCHISTOCYTES BLD QL SMEAR: (no result)
SODIUM SERPL-SCNC: 137 MMOL/L (ref 136–145)
TARGETS BLD QL SMEAR: (no result)
TOTAL CELLS COUNTED: 100 #CELLS
WBC NRBC COR # BLD AUTO: 7.4 10*3/UL (ref 4.8–10.8)

## 2020-03-10 NOTE — NUR
Discharge instructions reviewed with patient/family. Patient receptive and
verbalizes understanding. Follow-up care arranged. Written instructions given
to patient/family. PATIENT DISCHARGED TO [facility] BY WHEELCHAIR,
ACCOMPANIED BY [facility], FOR TRANSPORT HOME BY PRIVATE VEHICLE WITH HIS DAUGHTER.
DELLA GLYNN

## 2020-03-10 NOTE — NUR
case management visits with patient, he states he will return home when
medically stable  possibly today, patient stated he has home VNA with [facility],
case management spoke to respresentative from [facility], they were aware patient
was in the hospital. case management faxed patient's information to them and
they will resume when patient is discharged, case management will follow

## 2020-03-16 ENCOUNTER — HOSPITAL ENCOUNTER (EMERGENCY)
Dept: HOSPITAL 83 - ED | Age: 60
LOS: 1 days | Discharge: HOME | End: 2020-03-17
Payer: MEDICARE

## 2020-03-16 VITALS — BODY MASS INDEX: 22.97 KG/M2 | HEIGHT: 73.98 IN | WEIGHT: 179 LBS

## 2020-03-16 DIAGNOSIS — E72.20: ICD-10-CM

## 2020-03-16 DIAGNOSIS — Z79.899: ICD-10-CM

## 2020-03-16 DIAGNOSIS — E78.00: ICD-10-CM

## 2020-03-16 DIAGNOSIS — I10: ICD-10-CM

## 2020-03-16 DIAGNOSIS — E78.5: ICD-10-CM

## 2020-03-16 DIAGNOSIS — D64.9: Primary | ICD-10-CM

## 2020-03-16 LAB
ALBUMIN SERPL-MCNC: 2.4 GM/DL (ref 3.1–4.5)
ALP SERPL-CCNC: 126 U/L (ref 45–117)
ALT SERPL W P-5'-P-CCNC: 75 U/L (ref 12–78)
AST SERPL-CCNC: 86 IU/L (ref 3–35)
BASOPHILS # BLD AUTO: 3 % (ref 0–1)
BUN SERPL-MCNC: 44 MG/DL (ref 7–24)
BURR CELLS BLD QL SMEAR: (no result)
CHLORIDE SERPL-SCNC: 101 MMOL/L (ref 98–107)
CREAT SERPL-MCNC: 2.24 MG/DL (ref 0.7–1.3)
ERYTHROCYTE [DISTWIDTH] IN BLOOD BY AUTOMATED COUNT: 17.2 % (ref 0–14.5)
HCT VFR BLD AUTO: 23.9 % (ref 42–52)
HGB BLD-MCNC: 7.9 G/DL (ref 14–18)
INR BLD: 1.7 (ref 2–3.5)
MCH RBC QN AUTO: 34.2 PG (ref 27–31)
MCHC RBC AUTO-ENTMCNC: 33.1 G/DL (ref 33–37)
MCV RBC AUTO: 103.5 FL (ref 80–94)
NRBC BLD QL AUTO: 0 % (ref 0–0)
PLATELET # BLD AUTO: 79 10*3/UL (ref 130–400)
PLATELET SUFFICIENCY: (no result)
PMV BLD AUTO: 11.6 FL (ref 9.6–12.3)
POTASSIUM SERPL-SCNC: 3.9 MMOL/L (ref 3.5–5.1)
PROT SERPL-MCNC: 8.5 GM/DL (ref 6.4–8.2)
RBC # BLD AUTO: 2.31 10*6/UL (ref 4.5–5.9)
SODIUM SERPL-SCNC: 135 MMOL/L (ref 136–145)
TOTAL CELLS COUNTED: 100 #CELLS
WBC NRBC COR # BLD AUTO: 9.2 10*3/UL (ref 4.8–10.8)

## 2020-07-08 ENCOUNTER — HOSPITAL ENCOUNTER (OUTPATIENT)
Dept: HOSPITAL 83 - ED | Age: 60
Setting detail: OBSERVATION
LOS: 1 days | Discharge: HOME | End: 2020-07-09
Attending: INTERNAL MEDICINE | Admitting: INTERNAL MEDICINE
Payer: MEDICARE

## 2020-07-08 VITALS — SYSTOLIC BLOOD PRESSURE: 101 MMHG | DIASTOLIC BLOOD PRESSURE: 51 MMHG

## 2020-07-08 VITALS — SYSTOLIC BLOOD PRESSURE: 97 MMHG | DIASTOLIC BLOOD PRESSURE: 49 MMHG

## 2020-07-08 VITALS — DIASTOLIC BLOOD PRESSURE: 70 MMHG | SYSTOLIC BLOOD PRESSURE: 119 MMHG

## 2020-07-08 VITALS — SYSTOLIC BLOOD PRESSURE: 143 MMHG | DIASTOLIC BLOOD PRESSURE: 77 MMHG

## 2020-07-08 VITALS — SYSTOLIC BLOOD PRESSURE: 118 MMHG | DIASTOLIC BLOOD PRESSURE: 67 MMHG

## 2020-07-08 VITALS — DIASTOLIC BLOOD PRESSURE: 67 MMHG

## 2020-07-08 VITALS — SYSTOLIC BLOOD PRESSURE: 119 MMHG | DIASTOLIC BLOOD PRESSURE: 61 MMHG

## 2020-07-08 VITALS — DIASTOLIC BLOOD PRESSURE: 61 MMHG

## 2020-07-08 VITALS — SYSTOLIC BLOOD PRESSURE: 107 MMHG | DIASTOLIC BLOOD PRESSURE: 64 MMHG

## 2020-07-08 VITALS — SYSTOLIC BLOOD PRESSURE: 141 MMHG | DIASTOLIC BLOOD PRESSURE: 86 MMHG

## 2020-07-08 VITALS — DIASTOLIC BLOOD PRESSURE: 64 MMHG

## 2020-07-08 VITALS — DIASTOLIC BLOOD PRESSURE: 43 MMHG

## 2020-07-08 VITALS — SYSTOLIC BLOOD PRESSURE: 90 MMHG | DIASTOLIC BLOOD PRESSURE: 36 MMHG

## 2020-07-08 VITALS — DIASTOLIC BLOOD PRESSURE: 47 MMHG

## 2020-07-08 VITALS — BODY MASS INDEX: 20.71 KG/M2 | WEIGHT: 161.38 LBS | HEIGHT: 74 IN

## 2020-07-08 VITALS — DIASTOLIC BLOOD PRESSURE: 50 MMHG

## 2020-07-08 VITALS — SYSTOLIC BLOOD PRESSURE: 97 MMHG | DIASTOLIC BLOOD PRESSURE: 47 MMHG

## 2020-07-08 VITALS — DIASTOLIC BLOOD PRESSURE: 48 MMHG | SYSTOLIC BLOOD PRESSURE: 93 MMHG

## 2020-07-08 VITALS — SYSTOLIC BLOOD PRESSURE: 104 MMHG | DIASTOLIC BLOOD PRESSURE: 53 MMHG

## 2020-07-08 DIAGNOSIS — E80.6: ICD-10-CM

## 2020-07-08 DIAGNOSIS — I10: ICD-10-CM

## 2020-07-08 DIAGNOSIS — E87.8: ICD-10-CM

## 2020-07-08 DIAGNOSIS — R42: ICD-10-CM

## 2020-07-08 DIAGNOSIS — E43: ICD-10-CM

## 2020-07-08 DIAGNOSIS — F17.210: ICD-10-CM

## 2020-07-08 DIAGNOSIS — D64.9: Primary | ICD-10-CM

## 2020-07-08 DIAGNOSIS — G45.9: ICD-10-CM

## 2020-07-08 DIAGNOSIS — N17.0: ICD-10-CM

## 2020-07-08 DIAGNOSIS — E87.5: ICD-10-CM

## 2020-07-08 DIAGNOSIS — E11.65: ICD-10-CM

## 2020-07-08 DIAGNOSIS — K72.90: ICD-10-CM

## 2020-07-08 DIAGNOSIS — E83.42: ICD-10-CM

## 2020-07-08 DIAGNOSIS — E78.5: ICD-10-CM

## 2020-07-08 DIAGNOSIS — E87.1: ICD-10-CM

## 2020-07-08 LAB
ACANTHOCYTES BLD QL SMEAR: (no result)
ALBUMIN SERPL-MCNC: 2.6 GM/DL (ref 3.1–4.5)
ALP SERPL-CCNC: 158 U/L (ref 45–117)
ALT SERPL W P-5'-P-CCNC: 41 U/L (ref 12–78)
APTT PPP: 36.3 SECONDS (ref 20–32.1)
AST SERPL-CCNC: 30 IU/L (ref 3–35)
BASOPHILS # BLD AUTO: 1 % (ref 0–1)
BUN SERPL-MCNC: 41 MG/DL (ref 7–24)
BURR CELLS BLD QL SMEAR: (no result)
CHLORIDE SERPL-SCNC: 110 MMOL/L (ref 98–107)
CREAT SERPL-MCNC: 2.26 MG/DL (ref 0.7–1.3)
ERYTHROCYTE [DISTWIDTH] IN BLOOD BY AUTOMATED COUNT: 20.2 % (ref 0–14.5)
HCT VFR BLD AUTO: 19.2 % (ref 42–52)
INR BLD: 1.7 (ref 2–3.5)
MCH RBC QN AUTO: 31.5 PG (ref 27–31)
MCHC RBC AUTO-ENTMCNC: 32.8 G/DL (ref 33–37)
MCV RBC AUTO: 96 FL (ref 80–94)
NRBC BLD QL AUTO: 0 % (ref 0–0)
PLATELET # BLD AUTO: 72 10*3/UL (ref 130–400)
PLATELET SUFFICIENCY: (no result)
PMV BLD AUTO: 12.4 FL (ref 9.6–12.3)
POTASSIUM SERPL-SCNC: 5.5 MMOL/L (ref 3.5–5.1)
PROT SERPL-MCNC: 7.4 GM/DL (ref 6.4–8.2)
RBC # BLD AUTO: 2 10*6/UL (ref 4.5–5.9)
SODIUM SERPL-SCNC: 133 MMOL/L (ref 136–145)
TOTAL CELLS COUNTED: 100 #CELLS
WBC NRBC COR # BLD AUTO: 4.1 10*3/UL (ref 4.8–10.8)

## 2020-07-09 VITALS — SYSTOLIC BLOOD PRESSURE: 123 MMHG | DIASTOLIC BLOOD PRESSURE: 66 MMHG

## 2020-07-09 VITALS — DIASTOLIC BLOOD PRESSURE: 65 MMHG | SYSTOLIC BLOOD PRESSURE: 117 MMHG

## 2020-07-09 VITALS — DIASTOLIC BLOOD PRESSURE: 54 MMHG

## 2020-07-09 VITALS — SYSTOLIC BLOOD PRESSURE: 115 MMHG | DIASTOLIC BLOOD PRESSURE: 67 MMHG

## 2020-07-09 VITALS — DIASTOLIC BLOOD PRESSURE: 50 MMHG | SYSTOLIC BLOOD PRESSURE: 98 MMHG

## 2020-07-09 VITALS — SYSTOLIC BLOOD PRESSURE: 120 MMHG | DIASTOLIC BLOOD PRESSURE: 75 MMHG

## 2020-07-09 VITALS — SYSTOLIC BLOOD PRESSURE: 108 MMHG | DIASTOLIC BLOOD PRESSURE: 66 MMHG

## 2020-07-09 VITALS — DIASTOLIC BLOOD PRESSURE: 48 MMHG

## 2020-07-09 VITALS — DIASTOLIC BLOOD PRESSURE: 66 MMHG | SYSTOLIC BLOOD PRESSURE: 116 MMHG

## 2020-07-09 VITALS — DIASTOLIC BLOOD PRESSURE: 62 MMHG

## 2020-07-09 LAB
ALBUMIN SERPL-MCNC: 2.4 GM/DL (ref 3.1–4.5)
ALP SERPL-CCNC: 169 U/L (ref 45–117)
ALT SERPL W P-5'-P-CCNC: 42 U/L (ref 12–78)
AST SERPL-CCNC: 38 IU/L (ref 3–35)
BASOPHILS # BLD AUTO: 0 10*3/UL (ref 0–0.1)
BASOPHILS # BLD AUTO: 0 10*3/UL (ref 0–0.1)
BASOPHILS NFR BLD AUTO: 0.7 % (ref 0–1)
BASOPHILS NFR BLD AUTO: 0.9 % (ref 0–1)
BUN SERPL-MCNC: 41 MG/DL (ref 7–24)
BUN SERPL-MCNC: 45 MG/DL (ref 7–24)
CHLORIDE SERPL-SCNC: 113 MMOL/L (ref 98–107)
CHLORIDE SERPL-SCNC: 114 MMOL/L (ref 98–107)
CREAT SERPL-MCNC: 1.86 MG/DL (ref 0.7–1.3)
CREAT SERPL-MCNC: 2 MG/DL (ref 0.7–1.3)
EOSINOPHIL # BLD AUTO: 0.1 10*3/UL (ref 0–0.4)
EOSINOPHIL # BLD AUTO: 0.1 10*3/UL (ref 0–0.4)
EOSINOPHIL # BLD AUTO: 2.5 % (ref 1–4)
EOSINOPHIL # BLD AUTO: 3.1 % (ref 1–4)
ERYTHROCYTE [DISTWIDTH] IN BLOOD BY AUTOMATED COUNT: 20.2 % (ref 0–14.5)
ERYTHROCYTE [DISTWIDTH] IN BLOOD BY AUTOMATED COUNT: 20.4 % (ref 0–14.5)
HCT VFR BLD AUTO: 23.9 % (ref 42–52)
HCT VFR BLD AUTO: 25.8 % (ref 42–52)
LYMPHOCYTES # BLD AUTO: 1.5 10*3/UL (ref 1.3–4.4)
LYMPHOCYTES # BLD AUTO: 1.5 10*3/UL (ref 1.3–4.4)
LYMPHOCYTES NFR BLD AUTO: 33 % (ref 27–41)
LYMPHOCYTES NFR BLD AUTO: 35.3 % (ref 27–41)
MCH RBC QN AUTO: 29.4 PG (ref 27–31)
MCH RBC QN AUTO: 29.9 PG (ref 27–31)
MCHC RBC AUTO-ENTMCNC: 32.9 G/DL (ref 33–37)
MCHC RBC AUTO-ENTMCNC: 33.9 G/DL (ref 33–37)
MCV RBC AUTO: 88.2 FL (ref 80–94)
MCV RBC AUTO: 89.3 FL (ref 80–94)
MONOCYTES # BLD AUTO: 0.7 10*3/UL (ref 0.1–1)
MONOCYTES # BLD AUTO: 0.8 10*3/UL (ref 0.1–1)
MONOCYTES NFR BLD MANUAL: 16.1 % (ref 3–9)
MONOCYTES NFR BLD MANUAL: 17.6 % (ref 3–9)
NEUT #: 1.8 10*3/UL (ref 2.3–7.9)
NEUT #: 2 10*3/UL (ref 2.3–7.9)
NEUT %: 43.7 % (ref 47–73)
NEUT %: 45.7 % (ref 47–73)
NRBC BLD QL AUTO: 0 % (ref 0–0)
NRBC BLD QL AUTO: 0 10*3/UL (ref 0–0)
PLATELET # BLD AUTO: 62 10*3/UL (ref 130–400)
PLATELET # BLD AUTO: 72 10*3/UL (ref 130–400)
PMV BLD AUTO: 10.9 FL (ref 9.6–12.3)
PMV BLD AUTO: 12 FL (ref 9.6–12.3)
POTASSIUM SERPL-SCNC: 4.8 MMOL/L (ref 3.5–5.1)
POTASSIUM SERPL-SCNC: 5.4 MMOL/L (ref 3.5–5.1)
PROT SERPL-MCNC: 7.1 GM/DL (ref 6.4–8.2)
RBC # BLD AUTO: 2.71 10*6/UL (ref 4.5–5.9)
RBC # BLD AUTO: 2.89 10*6/UL (ref 4.5–5.9)
SODIUM SERPL-SCNC: 135 MMOL/L (ref 136–145)
SODIUM SERPL-SCNC: 137 MMOL/L (ref 136–145)
WBC NRBC COR # BLD AUTO: 4.2 10*3/UL (ref 4.8–10.8)
WBC NRBC COR # BLD AUTO: 4.4 10*3/UL (ref 4.8–10.8)

## 2020-07-31 ENCOUNTER — APPOINTMENT (OUTPATIENT)
Dept: GENERAL RADIOLOGY | Age: 60
DRG: 441 | End: 2020-07-31
Payer: MEDICARE

## 2020-07-31 ENCOUNTER — HOSPITAL ENCOUNTER (INPATIENT)
Age: 60
LOS: 7 days | Discharge: NURSING, REHAB OR LONG-TERM CARE | DRG: 441 | End: 2020-08-07
Attending: EMERGENCY MEDICINE | Admitting: INTERNAL MEDICINE
Payer: MEDICARE

## 2020-07-31 ENCOUNTER — APPOINTMENT (OUTPATIENT)
Dept: CT IMAGING | Age: 60
DRG: 441 | End: 2020-07-31
Payer: MEDICARE

## 2020-07-31 PROBLEM — K76.82 HEPATIC ENCEPHALOPATHY: Status: ACTIVE | Noted: 2020-07-31

## 2020-07-31 LAB
ABO/RH: NORMAL
ACANTHOCYTES: ABNORMAL
ACETAMINOPHEN LEVEL: <5 MCG/ML (ref 10–30)
ACETAMINOPHEN LEVEL: <5 MCG/ML (ref 10–30)
ALBUMIN SERPL-MCNC: 3.1 G/DL (ref 3.5–5.2)
ALBUMIN SERPL-MCNC: 3.3 G/DL (ref 3.5–5.2)
ALP BLD-CCNC: 234 U/L (ref 40–129)
ALP BLD-CCNC: 275 U/L (ref 40–129)
ALT SERPL-CCNC: 53 U/L (ref 0–40)
ALT SERPL-CCNC: 57 U/L (ref 0–40)
AMMONIA: 117 UMOL/L (ref 16–60)
AMPHETAMINE SCREEN, URINE: NOT DETECTED
AMPHETAMINE SCREEN, URINE: NOT DETECTED
ANION GAP SERPL CALCULATED.3IONS-SCNC: 11 MMOL/L (ref 7–16)
ANION GAP SERPL CALCULATED.3IONS-SCNC: 12 MMOL/L (ref 7–16)
ANISOCYTOSIS: ABNORMAL
ANISOCYTOSIS: ABNORMAL
ANTIBODY SCREEN: NORMAL
AST SERPL-CCNC: 66 U/L (ref 0–39)
AST SERPL-CCNC: 72 U/L (ref 0–39)
BACTERIA: ABNORMAL /HPF
BARBITURATE SCREEN URINE: NOT DETECTED
BARBITURATE SCREEN URINE: NOT DETECTED
BASOPHILS ABSOLUTE: 0.05 E9/L (ref 0–0.2)
BASOPHILS ABSOLUTE: 0.13 E9/L (ref 0–0.2)
BASOPHILS RELATIVE PERCENT: 0.9 % (ref 0–2)
BASOPHILS RELATIVE PERCENT: 2.6 % (ref 0–2)
BENZODIAZEPINE SCREEN, URINE: NOT DETECTED
BENZODIAZEPINE SCREEN, URINE: NOT DETECTED
BILIRUB SERPL-MCNC: 4.8 MG/DL (ref 0–1.2)
BILIRUB SERPL-MCNC: 5.4 MG/DL (ref 0–1.2)
BILIRUBIN DIRECT: 1.3 MG/DL (ref 0–0.3)
BILIRUBIN URINE: NEGATIVE
BILIRUBIN, INDIRECT: 3.5 MG/DL (ref 0–1)
BLOOD, URINE: NORMAL
BUN BLDV-MCNC: 34 MG/DL (ref 6–20)
BUN BLDV-MCNC: 38 MG/DL (ref 6–20)
BURR CELLS: ABNORMAL
BURR CELLS: ABNORMAL
CALCIUM IONIZED: 1.39 MMOL/L (ref 1.15–1.33)
CALCIUM SERPL-MCNC: 10.2 MG/DL (ref 8.6–10.2)
CALCIUM SERPL-MCNC: 10.5 MG/DL (ref 8.6–10.2)
CANNABINOID SCREEN URINE: NOT DETECTED
CANNABINOID SCREEN URINE: NOT DETECTED
CHLORIDE BLD-SCNC: 101 MMOL/L (ref 98–107)
CHLORIDE BLD-SCNC: 103 MMOL/L (ref 98–107)
CHP ED QC CHECK: NORMAL
CLARITY: CLEAR
CO2: 23 MMOL/L (ref 22–29)
CO2: 25 MMOL/L (ref 22–29)
COCAINE METABOLITE SCREEN URINE: NOT DETECTED
COCAINE METABOLITE SCREEN URINE: NOT DETECTED
COLOR: YELLOW
CREAT SERPL-MCNC: 1.3 MG/DL (ref 0.7–1.2)
CREAT SERPL-MCNC: 1.4 MG/DL (ref 0.7–1.2)
EKG ATRIAL RATE: 89 BPM
EKG P AXIS: 10 DEGREES
EKG P-R INTERVAL: 156 MS
EKG Q-T INTERVAL: 400 MS
EKG QRS DURATION: 86 MS
EKG QTC CALCULATION (BAZETT): 486 MS
EKG R AXIS: 48 DEGREES
EKG T AXIS: 34 DEGREES
EKG VENTRICULAR RATE: 89 BPM
EOSINOPHILS ABSOLUTE: 0.22 E9/L (ref 0.05–0.5)
EOSINOPHILS ABSOLUTE: 0.27 E9/L (ref 0.05–0.5)
EOSINOPHILS RELATIVE PERCENT: 4.1 % (ref 0–6)
EOSINOPHILS RELATIVE PERCENT: 5.2 % (ref 0–6)
ETHANOL: <10 MG/DL (ref 0–0.08)
ETHANOL: <10 MG/DL (ref 0–0.08)
FENTANYL SCREEN, URINE: NOT DETECTED
FENTANYL SCREEN, URINE: NOT DETECTED
GFR AFRICAN AMERICAN: >60
GFR AFRICAN AMERICAN: >60
GFR NON-AFRICAN AMERICAN: 52 ML/MIN/1.73
GFR NON-AFRICAN AMERICAN: 56 ML/MIN/1.73
GLUCOSE BLD-MCNC: 165 MG/DL (ref 74–99)
GLUCOSE BLD-MCNC: 243 MG/DL
GLUCOSE BLD-MCNC: 247 MG/DL (ref 74–99)
GLUCOSE URINE: NEGATIVE MG/DL
HAPTOGLOBIN: <10 MG/DL (ref 30–200)
HCT VFR BLD CALC: 23.9 % (ref 37–54)
HCT VFR BLD CALC: 25 % (ref 37–54)
HEMOGLOBIN: 7.9 G/DL (ref 12.5–16.5)
HEMOGLOBIN: 8.4 G/DL (ref 12.5–16.5)
HYPOCHROMIA: ABNORMAL
HYPOCHROMIA: ABNORMAL
IMMATURE GRANULOCYTES #: 0.02 E9/L
IMMATURE GRANULOCYTES %: 0.4 % (ref 0–5)
IMMATURE RETIC FRACT: 5.2 % (ref 2.3–13.4)
INR BLD: 1.7
KETONES, URINE: NEGATIVE MG/DL
LACTIC ACID: 3.3 MMOL/L (ref 0.5–2.2)
LEUKOCYTE ESTERASE, URINE: NEGATIVE
LIPASE: 17 U/L (ref 13–60)
LYMPHOCYTES ABSOLUTE: 1.73 E9/L (ref 1.5–4)
LYMPHOCYTES ABSOLUTE: 2.3 E9/L (ref 1.5–4)
LYMPHOCYTES RELATIVE PERCENT: 33.9 % (ref 20–42)
LYMPHOCYTES RELATIVE PERCENT: 42.4 % (ref 20–42)
Lab: NORMAL
Lab: NORMAL
MCH RBC QN AUTO: 30.1 PG (ref 26–35)
MCH RBC QN AUTO: 30.3 PG (ref 26–35)
MCHC RBC AUTO-ENTMCNC: 33.1 % (ref 32–34.5)
MCHC RBC AUTO-ENTMCNC: 33.6 % (ref 32–34.5)
MCV RBC AUTO: 89.6 FL (ref 80–99.9)
MCV RBC AUTO: 91.6 FL (ref 80–99.9)
METER GLUCOSE: 243 MG/DL (ref 74–99)
METHADONE SCREEN, URINE: NOT DETECTED
METHADONE SCREEN, URINE: NOT DETECTED
MONOCYTES ABSOLUTE: 0.49 E9/L (ref 0.1–0.95)
MONOCYTES ABSOLUTE: 0.51 E9/L (ref 0.1–0.95)
MONOCYTES RELATIVE PERCENT: 9 % (ref 2–12)
MONOCYTES RELATIVE PERCENT: 9.6 % (ref 2–12)
MYELOCYTE PERCENT: 0.9 % (ref 0–0)
NEUTROPHILS ABSOLUTE: 2.35 E9/L (ref 1.8–7.3)
NEUTROPHILS ABSOLUTE: 2.5 E9/L (ref 1.8–7.3)
NEUTROPHILS RELATIVE PERCENT: 43.2 % (ref 43–80)
NEUTROPHILS RELATIVE PERCENT: 47.8 % (ref 43–80)
NITRITE, URINE: NEGATIVE
OPIATE SCREEN URINE: NOT DETECTED
OPIATE SCREEN URINE: NOT DETECTED
OVALOCYTES: ABNORMAL
OXYCODONE URINE: NOT DETECTED
OXYCODONE URINE: NOT DETECTED
PDW BLD-RTO: 23.3 FL (ref 11.5–15)
PDW BLD-RTO: 23.6 FL (ref 11.5–15)
PH UA: 6 (ref 5–9)
PHENCYCLIDINE SCREEN URINE: NOT DETECTED
PHENCYCLIDINE SCREEN URINE: NOT DETECTED
PLATELET # BLD: 120 E9/L (ref 130–450)
PLATELET # BLD: 84 E9/L (ref 130–450)
PLATELET CONFIRMATION: NORMAL
PMV BLD AUTO: 11.7 FL (ref 7–12)
PMV BLD AUTO: 12 FL (ref 7–12)
POIKILOCYTES: ABNORMAL
POIKILOCYTES: ABNORMAL
POLYCHROMASIA: ABNORMAL
POTASSIUM REFLEX MAGNESIUM: 5.2 MMOL/L (ref 3.5–5)
POTASSIUM SERPL-SCNC: 4.7 MMOL/L (ref 3.5–5)
PROCALCITONIN: 0.14 NG/ML (ref 0–0.08)
PROTEIN UA: NEGATIVE MG/DL
PROTHROMBIN TIME: 19.1 SEC (ref 9.3–12.4)
RBC # BLD: 2.61 E12/L (ref 3.8–5.8)
RBC # BLD: 2.79 E12/L (ref 3.8–5.8)
RBC UA: ABNORMAL /HPF (ref 0–2)
RETIC HGB EQUIVALENT: 39.9 PG (ref 28.2–36.6)
RETICULOCYTE ABSOLUTE COUNT: 0.05 E12/L
RETICULOCYTE COUNT PCT: 1.8 % (ref 0.4–1.9)
SALICYLATE, SERUM: <0.3 MG/DL (ref 0–30)
SALICYLATE, SERUM: <0.3 MG/DL (ref 0–30)
SARS-COV-2, NAAT: NOT DETECTED
SCHISTOCYTES: ABNORMAL
SODIUM BLD-SCNC: 137 MMOL/L (ref 132–146)
SODIUM BLD-SCNC: 138 MMOL/L (ref 132–146)
SPECIFIC GRAVITY UA: 1.01 (ref 1–1.03)
TARGET CELLS: ABNORMAL
TOTAL PROTEIN: 7.4 G/DL (ref 6.4–8.3)
TOTAL PROTEIN: 7.7 G/DL (ref 6.4–8.3)
TRICYCLIC ANTIDEPRESSANTS SCREEN SERUM: NEGATIVE NG/ML
TRICYCLIC ANTIDEPRESSANTS SCREEN SERUM: NEGATIVE NG/ML
TROPONIN: 0.02 NG/ML (ref 0–0.03)
UROBILINOGEN, URINE: 0.2 E.U./DL
WBC # BLD: 5.1 E9/L (ref 4.5–11.5)
WBC # BLD: 5.4 E9/L (ref 4.5–11.5)
WBC UA: ABNORMAL /HPF (ref 0–5)

## 2020-07-31 PROCEDURE — 2580000003 HC RX 258: Performed by: INTERNAL MEDICINE

## 2020-07-31 PROCEDURE — 86850 RBC ANTIBODY SCREEN: CPT

## 2020-07-31 PROCEDURE — 70450 CT HEAD/BRAIN W/O DYE: CPT

## 2020-07-31 PROCEDURE — 72125 CT NECK SPINE W/O DYE: CPT

## 2020-07-31 PROCEDURE — 93005 ELECTROCARDIOGRAM TRACING: CPT | Performed by: EMERGENCY MEDICINE

## 2020-07-31 PROCEDURE — 86901 BLOOD TYPING SEROLOGIC RH(D): CPT

## 2020-07-31 PROCEDURE — 73552 X-RAY EXAM OF FEMUR 2/>: CPT

## 2020-07-31 PROCEDURE — 80076 HEPATIC FUNCTION PANEL: CPT

## 2020-07-31 PROCEDURE — 73521 X-RAY EXAM HIPS BI 2 VIEWS: CPT

## 2020-07-31 PROCEDURE — 86900 BLOOD TYPING SEROLOGIC ABO: CPT

## 2020-07-31 PROCEDURE — 81001 URINALYSIS AUTO W/SCOPE: CPT

## 2020-07-31 PROCEDURE — 82962 GLUCOSE BLOOD TEST: CPT

## 2020-07-31 PROCEDURE — 36415 COLL VENOUS BLD VENIPUNCTURE: CPT

## 2020-07-31 PROCEDURE — 2060000000 HC ICU INTERMEDIATE R&B

## 2020-07-31 PROCEDURE — 73030 X-RAY EXAM OF SHOULDER: CPT

## 2020-07-31 PROCEDURE — 6360000002 HC RX W HCPCS

## 2020-07-31 PROCEDURE — 85025 COMPLETE CBC W/AUTO DIFF WBC: CPT

## 2020-07-31 PROCEDURE — 80307 DRUG TEST PRSMV CHEM ANLYZR: CPT

## 2020-07-31 PROCEDURE — U0002 COVID-19 LAB TEST NON-CDC: HCPCS

## 2020-07-31 PROCEDURE — G0480 DRUG TEST DEF 1-7 CLASSES: HCPCS

## 2020-07-31 PROCEDURE — 80048 BASIC METABOLIC PNL TOTAL CA: CPT

## 2020-07-31 PROCEDURE — 83605 ASSAY OF LACTIC ACID: CPT

## 2020-07-31 PROCEDURE — 99285 EMERGENCY DEPT VISIT HI MDM: CPT

## 2020-07-31 PROCEDURE — 87088 URINE BACTERIA CULTURE: CPT

## 2020-07-31 PROCEDURE — 85610 PROTHROMBIN TIME: CPT

## 2020-07-31 PROCEDURE — 6370000000 HC RX 637 (ALT 250 FOR IP): Performed by: INTERNAL MEDICINE

## 2020-07-31 PROCEDURE — 87040 BLOOD CULTURE FOR BACTERIA: CPT

## 2020-07-31 PROCEDURE — 85045 AUTOMATED RETICULOCYTE COUNT: CPT

## 2020-07-31 PROCEDURE — 82140 ASSAY OF AMMONIA: CPT

## 2020-07-31 PROCEDURE — 6360000002 HC RX W HCPCS: Performed by: INTERNAL MEDICINE

## 2020-07-31 PROCEDURE — 84484 ASSAY OF TROPONIN QUANT: CPT

## 2020-07-31 PROCEDURE — 83010 ASSAY OF HAPTOGLOBIN QUANT: CPT

## 2020-07-31 PROCEDURE — 82330 ASSAY OF CALCIUM: CPT

## 2020-07-31 PROCEDURE — 80053 COMPREHEN METABOLIC PANEL: CPT

## 2020-07-31 PROCEDURE — 2580000003 HC RX 258: Performed by: EMERGENCY MEDICINE

## 2020-07-31 PROCEDURE — 84145 PROCALCITONIN (PCT): CPT

## 2020-07-31 PROCEDURE — 83690 ASSAY OF LIPASE: CPT

## 2020-07-31 PROCEDURE — 96374 THER/PROPH/DIAG INJ IV PUSH: CPT

## 2020-07-31 PROCEDURE — 71045 X-RAY EXAM CHEST 1 VIEW: CPT

## 2020-07-31 RX ORDER — LACTULOSE 10 G/15ML
20 SOLUTION ORAL 3 TIMES DAILY
Status: DISCONTINUED | OUTPATIENT
Start: 2020-07-31 | End: 2020-08-07 | Stop reason: HOSPADM

## 2020-07-31 RX ORDER — CIPROFLOXACIN 2 MG/ML
400 INJECTION, SOLUTION INTRAVENOUS EVERY 12 HOURS
Status: DISCONTINUED | OUTPATIENT
Start: 2020-07-31 | End: 2020-08-07 | Stop reason: HOSPADM

## 2020-07-31 RX ORDER — ONDANSETRON 2 MG/ML
4 INJECTION INTRAMUSCULAR; INTRAVENOUS EVERY 6 HOURS PRN
Status: DISCONTINUED | OUTPATIENT
Start: 2020-07-31 | End: 2020-08-07 | Stop reason: HOSPADM

## 2020-07-31 RX ORDER — LORAZEPAM 2 MG/ML
INJECTION INTRAMUSCULAR
Status: COMPLETED
Start: 2020-07-31 | End: 2020-07-31

## 2020-07-31 RX ORDER — SODIUM CHLORIDE 0.9 % (FLUSH) 0.9 %
10 SYRINGE (ML) INJECTION PRN
Status: DISCONTINUED | OUTPATIENT
Start: 2020-07-31 | End: 2020-08-07 | Stop reason: HOSPADM

## 2020-07-31 RX ORDER — ACETAMINOPHEN 325 MG/1
650 TABLET ORAL EVERY 6 HOURS PRN
Status: DISCONTINUED | OUTPATIENT
Start: 2020-07-31 | End: 2020-08-07 | Stop reason: HOSPADM

## 2020-07-31 RX ORDER — PROMETHAZINE HYDROCHLORIDE 25 MG/1
12.5 TABLET ORAL EVERY 6 HOURS PRN
Status: DISCONTINUED | OUTPATIENT
Start: 2020-07-31 | End: 2020-08-07 | Stop reason: HOSPADM

## 2020-07-31 RX ORDER — PANTOPRAZOLE SODIUM 40 MG/1
40 TABLET, DELAYED RELEASE ORAL 2 TIMES DAILY
COMMUNITY

## 2020-07-31 RX ORDER — POLYETHYLENE GLYCOL 3350 17 G/17G
17 POWDER, FOR SOLUTION ORAL DAILY PRN
Status: DISCONTINUED | OUTPATIENT
Start: 2020-07-31 | End: 2020-08-07 | Stop reason: HOSPADM

## 2020-07-31 RX ORDER — LACTULOSE 10 G/15ML
30 SOLUTION ORAL 4 TIMES DAILY
COMMUNITY

## 2020-07-31 RX ORDER — GLIPIZIDE 5 MG/1
2.5 TABLET ORAL DAILY
COMMUNITY

## 2020-07-31 RX ORDER — INSULIN GLARGINE 100 [IU]/ML
15 INJECTION, SOLUTION SUBCUTANEOUS NIGHTLY
Status: ON HOLD | COMMUNITY
End: 2020-08-07 | Stop reason: HOSPADM

## 2020-07-31 RX ORDER — SUCRALFATE 1 G/1
1 TABLET ORAL 4 TIMES DAILY
COMMUNITY

## 2020-07-31 RX ORDER — ERGOCALCIFEROL 1.25 MG/1
50000 CAPSULE ORAL WEEKLY
COMMUNITY

## 2020-07-31 RX ORDER — FUROSEMIDE 40 MG/1
40 TABLET ORAL DAILY
Status: ON HOLD | COMMUNITY
End: 2020-08-07 | Stop reason: HOSPADM

## 2020-07-31 RX ORDER — LORAZEPAM 2 MG/ML
1 INJECTION INTRAMUSCULAR ONCE
Status: COMPLETED | OUTPATIENT
Start: 2020-07-31 | End: 2020-07-31

## 2020-07-31 RX ORDER — SODIUM CHLORIDE 0.9 % (FLUSH) 0.9 %
10 SYRINGE (ML) INJECTION EVERY 12 HOURS SCHEDULED
Status: DISCONTINUED | OUTPATIENT
Start: 2020-07-31 | End: 2020-08-07 | Stop reason: HOSPADM

## 2020-07-31 RX ORDER — INSULIN LISPRO 200 [IU]/ML
5 INJECTION, SOLUTION SUBCUTANEOUS
COMMUNITY

## 2020-07-31 RX ORDER — ACETAMINOPHEN 650 MG/1
650 SUPPOSITORY RECTAL EVERY 6 HOURS PRN
Status: DISCONTINUED | OUTPATIENT
Start: 2020-07-31 | End: 2020-08-07 | Stop reason: HOSPADM

## 2020-07-31 RX ORDER — INSULIN GLARGINE 100 [IU]/ML
30 INJECTION, SOLUTION SUBCUTANEOUS EVERY MORNING
Status: ON HOLD | COMMUNITY
End: 2020-08-07 | Stop reason: HOSPADM

## 2020-07-31 RX ORDER — 0.9 % SODIUM CHLORIDE 0.9 %
1000 INTRAVENOUS SOLUTION INTRAVENOUS ONCE
Status: COMPLETED | OUTPATIENT
Start: 2020-07-31 | End: 2020-07-31

## 2020-07-31 RX ADMIN — LORAZEPAM 1 MG: 2 INJECTION INTRAMUSCULAR; INTRAVENOUS at 09:53

## 2020-07-31 RX ADMIN — CIPROFLOXACIN 400 MG: 2 INJECTION, SOLUTION INTRAVENOUS at 19:16

## 2020-07-31 RX ADMIN — SODIUM CHLORIDE, PRESERVATIVE FREE 10 ML: 5 INJECTION INTRAVENOUS at 21:04

## 2020-07-31 RX ADMIN — SODIUM CHLORIDE 1000 ML: 9 INJECTION, SOLUTION INTRAVENOUS at 09:26

## 2020-07-31 RX ADMIN — LACTULOSE: 10 SOLUTION ORAL at 19:16

## 2020-07-31 RX ADMIN — LACTULOSE 20 G: 20 SOLUTION ORAL at 21:04

## 2020-07-31 RX ADMIN — LORAZEPAM 1 MG: 2 INJECTION INTRAMUSCULAR at 09:53

## 2020-07-31 RX ADMIN — ENOXAPARIN SODIUM 40 MG: 40 INJECTION SUBCUTANEOUS at 18:10

## 2020-07-31 ASSESSMENT — PAIN SCALES - GENERAL: PAINLEVEL_OUTOF10: 0

## 2020-07-31 NOTE — H&P
[facility]  Internal Medicine Residency Program  Medical Student History and Physical    Patient:  Anyi Hester 62 y.o. male MRN: [id number]     Date of Service: 7/31/2020    Hospital Day: 1      Chief complaint: had concerns including Fall (fell out of bed this morning at facility, brought by stat with little info from firefighters at scene or facility. ). History of Present Illness   The patient is a 62 y.o. male with a PMHx of alcohol abuse, Wernike's encephalopathy, subdural hematoma (01/2020), HTN, HLD, T2DM, multiple back surgeries with pain pump (2016), cirrhosis with liver failure, CKD, left foot drop, and anemia who presented to the ED via STAT helicopter with altered mental status after a fall at an outside rehab facility. His wife provided the history at bedside since he was unable to communicate. Patient was last known to be well by his wife yesterday. She says she calls him 3x/d and that he was of his baseline mental status yesterday on the phone. She has never seen him with the current presentation before. She described ongoing health issues since a hospitalization in January for subdural hematoma, including extensive workup for persistent anemia that requires transfusions every few weeks. The patient had a bone marrow biopsy done in [place] in June but they have yet to follow up with results from the oncologist. The wife described the patient's baseline as communicative, but that he \"won't talk, but he will answer questions. \" He also has had weight loss over the past 7 months, from ~220 pounds before January 2020 to 151 pounds currently. His wife said he was using a walker at home. Mr. Fabiola López was laying on his left side in bed in a cervical collar. He had purposeless movement of all four limbs. He would occasionally open his eyes.  There were positive patellar and triceps reflexes on the right, unknown reflexes on the left since his position did not allow adequate evaluation. His left pupil was non-reactive, right pupil sluggish to light. He did not withdraw from pain. Upper extremity was rigid. Heart and lungs were clear, possible fluid wave on abdominal exam. Palmar erythema noted on PE. Abdomen soft, but patient was laying on his side during the exam. Patient was markedly jaundiced and possibly had Fetor hepaticus. ED Course:     Patient presented via STAT helicopter from an outside rehab facility with altered mental status and history of a fall. Medication list was obtained from his wife. Temp- 98.4  Resp- 20  Pulse- 93  BP- 126/105  SpO2- 98 on room air     GCS was 9 on arrival (eye 4, verbal 2, motor 3). ED Meds: Patient was given lorazepam 1 mg  ED Fluids: Patient was given NS 1000 mL bolus    Past Medical History:      Diagnosis Date    Diabetes mellitus ([facility]     diet controlled    History of blood transfusion 12/2014    during back surgery    Hyperlipidemia     Hypertension        Past Surgical History:        Procedure Laterality Date    BACK SURGERY  2014    fusion lumbar    ELBOW SURGERY  2014    aspirated with infection     HERNIA REPAIR      OTHER SURGICAL HISTORY  6 16 16    stage 1 4 day percutaneous trial medtronic lumbar spinal cord stimulator    OTHER SURGICAL HISTORY N/A 10/10/2016    surgical implantation medtronic lumbar spinal cord stimulator electrodfe and generator       Medications Prior to Admission:    Prior to Admission medications    Medication Sig Start Date End Date Taking?  Authorizing Provider   folic acid (FOLVITE) 1 MG tablet Take 1 mg by mouth daily    Historical Provider, MD   cyanocobalamin 1000 MCG/ML injection Inject 1,000 mcg into the muscle every 7 days    Historical Provider, MD   lisinopril (PRINIVIL;ZESTRIL) 20 MG tablet Take 40 mg by mouth daily     Historical Provider, MD   metoprolol tartrate (LOPRESSOR) 50 MG tablet Take 50 mg by mouth 2 times daily     Historical Provider, MD       Allergies: Patient has no known allergies. Social History:   TOBACCO:   reports that he has quit smoking. He quit after 4.00 years of use. His smokeless tobacco use includes chew. ETOH:   Wife claims patient has not had alcohol since admission in January- ethanol level was 277 on admission 1/2020. OCCUPATION:     Family History:   No family history on file. REVIEW OF SYSTEMS: incomplete due to AMS    · Constitutional: No fever, no chills, weight loss over the past 6 months from ~220 to 150 currently  · Respiratory: No cough, no sputum production, no shortness of breath  · Cardiology: No palpitation, no leg swelling. Regular rhythm. Occasionally tachycardic up to 102 in ED.      · Skin: Palmar erythema and jaundice     Physical Exam   · Vitals: /82   Pulse 100   Temp 97.5 °F (36.4 °C) (Temporal)   Resp 16   Wt 185 lb (83.9 kg)   SpO2 98%   BMI 23.75 kg/m²     · General Appearance: disoriented, agitated, frail-appearing, cachectic and in moderate distress  · Skin: warm and dry and jaundiced with palmar erythema  · Head: normocephalic and atraumatic  · Eyes: pupillary abnormality- sluggish on the right, non-reactive on the left and sclera icteric  · Neck: patient in a cervical collar due to fall    · Pulmonary/Chest: clear to auscultation bilaterally- no wheezes, rales or rhonchi, normal air movement, no respiratory distress  · Cardiovascular: normal rate, normal S1 and S2, no gallops, intact distal pulses and no carotid bruits  · Abdomen: soft, non-tender and non-distended  · Extremities: no cyanosis, clubbing or edema   · Neurologic: normal triceps and patellar reflex on the right, Babinski sign negative BL and abnormal muscle tone present- upper extremity rigidity    Labs and Imaging Studies   Basic Labs  Recent Labs     07/31/20  0911 07/31/20  0921   NA  --  137   K  --  5.2*   CL  --  101   CO2  --  25   BUN  --  38*   CREATININE  --  1.4*   GLUCOSE 243 247*   CALCIUM  --  10.5*       Recent Labs 20  0921   WBC 5.1   RBC 2.79*   HGB 8.4*   HCT 25.0*   MCV 89.6   MCH 30.1   MCHC 33.6   RDW 23.6*   *   MPV 12.0       CBC:   Lab Results   Component Value Date    WBC 5.1 2020    RBC 2.79 2020    HGB 8.4 2020    HCT 25.0 2020    MCV 89.6 2020    RDW 23.6 2020     2020     BMP:    Lab Results   Component Value Date     2020    K 5.2 2020     2020    CO2 25 2020    BUN 38 2020     HFP:    Lab Results   Component Value Date    PROT 7.7 2020     PT/INR:  No results found for: PTINR  PTT:    Lab Results   Component Value Date    APTT 37.8 2020       Imaging Studies:     Xr Shoulder Right (min 2 Views)    Result Date: 2020  Patient MRN:  [id number] : 1960 Age: 62 years Gender: Male Order Date:  2020 9:00 AM EXAM: XR SHOULDER RIGHT (MIN 2 VIEWS), XR SHOULDER LEFT (MIN 2 VIEWS) NUMBER OF IMAGES:  4 INDICATION: Acute fall with bilateral shoulder pain. Trauma COMPARISON: None TECHNIQUE: 2 view right shoulder and two-view left shoulder. FINDINGS: No acute fracture or dislocation. Soft tissues unremarkable. Bones normally mineralized. No significant abnormal findings at either shoulder. Xr Hip Bilateral W Ap Pelvis (2 Views)    Result Date: 2020  Patient MRN:  [id number] : 1960 Age: 62 years Gender: Male Order Date:  2020 9:15 AM EXAM: XR HIP BILATERAL W AP PELVIS (2 VIEWS), XR FEMUR RIGHT (MIN 2 VIEWS), XR FEMUR LEFT (MIN 2 VIEWS) NUMBER OF IMAGES:  7 INDICATION: Acute fall with pelvic, bilateral hips and bilateral femur pain. Trauma COMPARISON: Pelvis dated 2020 TECHNIQUE: Bilateral hips and pelvis 3 views, right femur 2 views and left femur 2 views FINDINGS: No acute fracture or dislocation. Bones are normally mineralized. Hip joint spaces are relatively preserved. There are unremarkable soft tissues.      Unremarkable pelvis, bilateral hips and bilateral femora. Xr Femur Left (min 2 Views)    Result Date: 2020  Patient MRN:  [id number] : 1960 Age: 62 years Gender: Male Order Date:  2020 9:15 AM EXAM: XR HIP BILATERAL W AP PELVIS (2 VIEWS), XR FEMUR RIGHT (MIN 2 VIEWS), XR FEMUR LEFT (MIN 2 VIEWS) NUMBER OF IMAGES:  7 INDICATION: Acute fall with pelvic, bilateral hips and bilateral femur pain. Trauma COMPARISON: Pelvis dated 2020 TECHNIQUE: Bilateral hips and pelvis 3 views, right femur 2 views and left femur 2 views FINDINGS: No acute fracture or dislocation. Bones are normally mineralized. Hip joint spaces are relatively preserved. There are unremarkable soft tissues. Unremarkable pelvis, bilateral hips and bilateral femora. Xr Femur Right (min 2 Views)    Result Date: 2020  Patient MRN:  [id number] : 1960 Age: 62 years Gender: Male Order Date:  2020 9:15 AM EXAM: XR HIP BILATERAL W AP PELVIS (2 VIEWS), XR FEMUR RIGHT (MIN 2 VIEWS), XR FEMUR LEFT (MIN 2 VIEWS) NUMBER OF IMAGES:  7 INDICATION: Acute fall with pelvic, bilateral hips and bilateral femur pain. Trauma COMPARISON: Pelvis dated 2020 TECHNIQUE: Bilateral hips and pelvis 3 views, right femur 2 views and left femur 2 views FINDINGS: No acute fracture or dislocation. Bones are normally mineralized. Hip joint spaces are relatively preserved. There are unremarkable soft tissues. Unremarkable pelvis, bilateral hips and bilateral femora. Ct Head Wo Contrast    Result Date: 2020  Patient MRN:  [id number] : 1960 Age: 62 years Gender: Male Order Date:  2020 9:00 AM EXAM: CT HEAD WO CONTRAST, CT CERVICAL SPINE WO CONTRAST Technique: Low-dose CT  acquisition technique included one of following options; 1 . Automated exposure control, 2. Adjustment of MA and or KV according to patient's size. 3. Use of interactive reconstruction. Dosage: 1822 mGy-cm.  INDICATION:  Trauma Trauma COMPARISON: 2020 FINDINGS:  There is moderate chronic ischemic/degenerative change in the white matter for patient's age. There is some mild to moderate generalized atrophy. There is no mass effect edema or hemorrhage. There is complete opacification of the left maxillary sinus and considerable opacification of the left side of the sphenoid sinus. There is a small amount mucosal thickening left ethmoid air cells. No skull fractures are noted. There is slight anterior subluxation of C3 with respect to  C4 felt to be degenerative. There is slight anterior subluxation of C4 with respect to C5 felt to be degenerative. There is significant disc space narrowing at C5-6 and C6-7. There is focal kyphosis in this region. There are significant anterior spurs. There is slight retrolisthesis of C5 with respect to C6 felt to be degenerative. There is mild chronic compression deformity of the caudal endplate of C5 and superior endplate of C6. There are no fractures. At C2-3 there is right-sided facet arthrosis of a moderate degree. There are posterior spurs. Some right-sided facet arthrosis C3 -4 with mild stenosis and right foraminal narrowing. At C4-5 there is significant right-sided facet arthrosis. There is right lateral spurs. Mild stenosis and right foraminal narrowing. At C5-6 diffuse posterior spurs causes moderate to moderately severe stenosis. At C6-7 small posterior spurs cause mild stenosis    Mild to moderate generalized atrophy and moderate chronic ischemic/degenerative changes in the white matter for the patient's age. There is no acute process. Significant degenerative changes in the cervical spine most pronounced C5-6. There is moderately severe stenosis at this level.  There are no acute fractures    Ct Cervical Spine Wo Contrast    Result Date: 2020  Patient MRN:  [id number] : 1960 Age: 62 years Gender: Male Order Date:  2020 9:00 AM EXAM: CT HEAD WO CONTRAST, CT CERVICAL SPINE WO CONTRAST Technique: Low-dose CT  acquisition technique included one Left (min 2 Views)    Result Date: 2020  Patient MRN:  [id number] : 1960 Age: 62 years Gender: Male Order Date:  2020 9:00 AM EXAM: XR SHOULDER RIGHT (MIN 2 VIEWS), XR SHOULDER LEFT (MIN 2 VIEWS) NUMBER OF IMAGES:  4 INDICATION: Acute fall with bilateral shoulder pain. Trauma COMPARISON: None TECHNIQUE: 2 view right shoulder and two-view left shoulder. FINDINGS: No acute fracture or dislocation. Soft tissues unremarkable. Bones normally mineralized. No significant abnormal findings at either shoulder. Xr Chest Portable    Result Date: 2020  Patient MRN: [id number] : 1960 Age:  62 years Gender: Male Order Date: 2020 9:00 AM Exam: XR CHEST PORTABLE Number of Images: 1 view Indication:  Acute fall with chest Trauma Comparison: 2020 FINDINGS: Indwelling T-spine neurostimulator. Heart and pulmonary vascularity normal. Lungs clear. Costophrenic angles sharp. Normal aorta. No acute cardiopulmonary findings. EKG: normal sinus rhythm, prolonged QT interval    Student's Assessment and Plan     Piotr Gilmore is a 62 y.o. male with PMHx of Wernicke's encephalopathy, alcohol abuse, liver failure, HTN, HLD, and T2DM who presented to the ED on  from an outside rehab facility due to altered mental status and fall. His GCS was 9 on presentation. Head/neck CT showed no acute processes but moderate to severe stenosis of C5-6 and moderate generalized atrophy and chronic ischemic changes in the white matter. XR of shoulder, hip, chest, and femur/pelvis showed no acute processes. EKG showed normal sinus rhythm with prolonged QT interval. Troponin 0.02 once. 1. Acute Encephalopathy 2/2 metabolic derangement (hepatic encephalopathy/ hyponatremia) vs infection (spontaneous bacterial peritonitis) vs other (GI bleed/ renal failure/ portal vein thrombosis)     GCS 9 on admission. Head/neck CT and shoulder, hip, chest, femur/ pelvis XR showed no acute processes.  Upper extremity muscle rigidity present on PE.      - BUN 38 and Cr 1.4    - Lactic acid 3.3   - WBC 5.1   - Ammonia 117- continue to trend   - Check for ascites    - Rule out infection- CRP, ESR     2. Mild Hyperkalemia 2/2 CKD Stage III vs LAURO vs insulin deficiency      - Potassium 5.2   - GFR 52, Cr 1.4    - Glucose 247   - Follow BMP and urine output    3. Lactic acidosis 2/2 liver failure     - Lactic acid 3.3- continue to trend   - Normal anion gap 11    4. Mild Hypercalcemia possibly 2/2 to long periods of immobilization vs Vit D supplementation     - Ca 10.5   - Vit D 1.25 mg weekly    - Monitor daily labs   - F/u with ionized calcium    5. Ho Cirrhosis and Liver Failure 2/2 alcohol use disorder     Patient noted to be jaundiced on physical exam with palmar erythema. Supplementing folic acid 1 mg daily, B12 1000 mcg every 2 weeks, vit D 1.25 mg weekly. PT 19.1, INR within normal limits. - Alk phos elevated at 275   - AST/ ALT elevated, not suggestive of alcoholic pattern- 35:36   - Albumin 3.3- unchanged from admission on 1/29/2020   - Bilirubin 4.8, direct 1.3, indirect 3.5   - Lactulose 10 g QID   - Rifaximin 550 mg BID     6. Ho Normocytic Normochromic Anemia 2/2 CKD stage III vs liver cirrhosis    Pt was being worked up in [place], bone marrow biopsy in June. Have yet to go over results. Was needing transfusions every ~3 weeks. Elevated RDW. - Hemoglobin 8.4   - Hematocrit 25.0   - Follow CBC, transfuse if <7   - F/u iron, iron sat, TIBC, ferritin, B12   - F/u blood smear, haptoglobin, retic count       7. Ho CDK Stage III      - GFR 52   - Cr 1.4    - Monitor urine output    - Follow kidney function    8. Ho HTN- /82     - Lasix 40 mg daily   - Lopressor 50 mg BID     9. Ho HLD     - Simvastatin 40 mg in the past, not currently taking it   - F/u with lipid panel     10.  Ho Type II DM- glucose 243 on admission     - Glipizide 5 mg daily   - Humalog 5 units with meals   - Humalog 1 unit sliding scale

## 2020-07-31 NOTE — CARE COORDINATION
"Problem: Psychotic Symptoms  Goal: Psychotic Symptoms  Signs and symptoms of listed problems will be absent or manageable.   Outcome: Declining  Patient presents as guarded, paranoid, dismissive, and uncooperative.  He becomes quite irritable when asked about psychotic symptoms and aggressive ideation.  He refuses to answer many of my interview questions, terminating our discussion by stating: \"I'm a schizophrenic, I take meds, and that is all you need to know!\".  He then stared at me in a menacing, intimidating manner.  Later in the shift, patient engaged in very aggressive behaviors and was removed from the therapeutic milieu.  Emergency PRN medications were administered.  Patient demonstrates no appreciable insight into his mental illness.  In the last 48 hours, it appears that his psychotic symptoms are getting worse.  He has been compliant with all of his scheduled medications, and no adverse side effects have been reported or observed.  He denies any acute physical concerns.  Will continue to monitor closely.      " Social Work [address] [place] [address] Planning:    Pt presents to the ED via STAT helicopter after a fall out of bed at [facility]. SW met with pt and wife. Pt was lying in bed, ccollar on, altered mental status and unable to provide history. Pt's wife reports pt has hx of alcohol addiction but has not had a drink since January of this year. Pt also has hx of liver failure. Pt's wife reports pt had a bone marrow biopsy completed at [facility] and they are waiting for results. Pt's wife reports plan will be for pt to return to [facility] upon discharge. GEORGIE spoke to [facility] with [place] who reports pt is not a bed hold but able to return upon discharge. Assigned SW/CM to follow.

## 2020-07-31 NOTE — ED PROVIDER NOTES
KERON Flores is a 62 y.o. male with a PMHx significant for hypertension, hyperlipidemia, type 2 diabetes mellitus, subdural hematoma (1/2020) and multiple back surgeries (pain pump in place) who presents via [facility] following a fall out of bed at a rehab facility. On arrival, the patient is unable to provide any meaningful past history, but he is moaning loudly. He moves all extremities. EMS provided a very limited history noting that the patient fell out of bed and was deemed to be somewhat altered from his baseline. They state that he has a history of alcoholism and chronic cirrhosis bordering on liver failure. The patient is currently taking no blood thinners. Tobacco Hx:   reports that he has quit smoking. He quit after 4.00 years of use. His smokeless tobacco use includes chew. Alcohol Hx:   reports current alcohol use. Illicit Drug Hx:  Records indicate the patient is previously denied all illicit drug use. The history is provided by EMS and obtained via chart review. The patient is unable to provide any meaningful history 2/2 their condition. Last Tetanus (if applicable): N/A    Review of Systems   Unable to perform ROS: Mental status change        Physical Exam  Vitals signs and nursing note reviewed. Constitutional:       General: He is awake. Comments: Patient is awake and continuously moaning on presentation. He is unable or unwilling to provide any significant past medical history. He appears cachectic. He is in a cervical collar and appears to be in mild distress. HENT:      Head: Normocephalic and atraumatic. Right Ear: External ear normal.      Left Ear: External ear normal.      Nose: Nose normal. No congestion or rhinorrhea. Mouth/Throat:      Mouth: Mucous membranes are moist.      Pharynx: Oropharynx is clear. Eyes:      General: No scleral icterus. Right eye: No discharge. Left eye: No discharge.       Extraocular Movements: Extraocular movements intact. Conjunctiva/sclera: Conjunctivae normal.   Neck:      Musculoskeletal: Neck supple. No neck rigidity or muscular tenderness. Comments: Patient is in a c-collar on arrival.  No point tenderness noted to the cervical spinous processes. Cardiovascular:      Rate and Rhythm: Regular rhythm. Tachycardia present. Heart sounds: Normal heart sounds. No murmur. No friction rub. No gallop. Comments: Upper extremity and lower extremity distal pulses intact bilaterally +2/4  Pulmonary:      Effort: Pulmonary effort is normal.      Breath sounds: Normal breath sounds. No wheezing, rhonchi or rales. Comments: Mildly decreased air movement noted throughout. Symmetric lung expansion. No signs of respiratory distress. Abdominal:      General: Bowel sounds are normal. There is no distension. Palpations: Abdomen is soft. There is no mass. Tenderness: There is no abdominal tenderness. There is no guarding. Musculoskeletal: Normal range of motion. General: No tenderness or deformity. Right lower leg: No edema. Left lower leg: No edema. Comments: No point tenderness to the spinous processes in the thoracic and lumbar regions. There is a pain pump noted in the left lateral lumbar region. Multiple scars noted throughout the lumbar region. Left foot appears to exhibit signs of foot drop. Lymphadenopathy:      Cervical: No cervical adenopathy. Skin:     General: Skin is warm and dry. Capillary Refill: Capillary refill takes less than 2 seconds. Findings: No erythema or rash. Comments: Large midline scar noted in the lumbar region. There are 2 other small scars are noted on the left lateral aspect of the lumbar region, 1 of which is due to the prior placement of a pain pump. Neurological:      Mental Status: He is disoriented. GCS: GCS eye subscore is 4. GCS verbal subscore is 2. GCS motor subscore is 3. Comments: Comprehensive neuro exam unable to be completed secondary to patient's altered mental status. It is unclear what the patient's baseline is. On arrival, GCS is 9.       --------------------------------------------- PAST HISTORY ---------------------------------------------  Past Medical History:  has a past medical history of Diabetes mellitus ([facility] [address] History of blood transfusion, Hyperlipidemia, and Hypertension. Past Surgical History:  has a past surgical history that includes back surgery (2014); Elbow surgery (2014); other surgical history (6 16 16); hernia repair; and other surgical history (N/A, 10/10/2016). Social History:  reports that he has quit smoking. He quit after 4.00 years of use. His smokeless tobacco use includes chew. He reports current alcohol use. He reports that he does not use drugs. Family History: family history is not on file.      Home Meds: Medications Prior to Admission: white petrolatum OINT ointment, Apply topically 2 times daily as needed  Multiple Vitamins-Minerals (CENTRUM SILVER PO), Take 1 tablet by mouth daily  furosemide (LASIX) 40 MG tablet, Take 40 mg by mouth daily In the morning for edema  glipiZIDE (GLUCOTROL) 5 MG tablet, Take 2.5 mg by mouth daily In the morning for diabetes  insulin lispro (HUMALOG KWIKPEN) 200 UNIT/ML SOPN pen, Inject 5 Units into the skin 3 times daily (with meals)  insulin glargine (LANTUS) 100 UNIT/ML injection vial, Inject 15 Units into the skin nightly  insulin glargine (LANTUS) 100 UNIT/ML injection vial, Inject 30 Units into the skin every morning  lactulose (CHRONULAC) 10 GM/15ML solution, Take 30 mLs by mouth 4 times daily  pantoprazole (PROTONIX) 40 MG tablet, Take 40 mg by mouth 2 times daily For GERD  sucralfate (CARAFATE) 1 GM tablet, Take 1 g by mouth 4 times daily For gastric protection  vitamin D (ERGOCALCIFEROL) 1.25 MG (64468 UT) CAPS capsule, Take 50,000 Units by mouth once a week Give one capsule by mouth in the morning every Sunday for supplement . Due 08/02/2020  insulin lispro (HUMALOG) 100 UNIT/ML injection vial, Inject 1 Units into the skin 3 times daily (before meals) Inject as per sliding scale : If -175= 3 units, 176-200=4 units, 201-225= 5 units, 226-250= 6 units, 251-300= 8 units, 301-350= 10 units, 351-400=14 units. For  and greater CALL MD  folic acid (FOLVITE) 1 MG tablet, Take 1 mg by mouth daily  cyanocobalamin 1000 MCG/ML injection, Inject 1,000 mcg into the muscle every 14 days Every 2 weeks on Sunday. Due 08/09/2020  metoprolol tartrate (LOPRESSOR) 50 MG tablet, Take 25 mg by mouth 2 times daily   The patients home medications have been reviewed. Allergies: Patient has no known allergies. ------------------------- NURSING NOTES AND VITALS REVIEWED ---------------------------  Date / Time Roomed:  7/31/2020  8:45 AM  ED Bed Assignment:  [id number]    The nursing notes within the ED encounter and vital signs as below have been reviewed. /70   Pulse 100   Temp 97.2 °F (36.2 °C) (Temporal)   Resp 21   Ht 6' 2\" (1.88 m)   Wt 151 lb (68.5 kg)   SpO2 99%   BMI 19.39 kg/m²   -------------------------------------------------- RESULTS / INTERVENTIONS -------------------------------------------------  All laboratory and radiology tests have been reviewed by this physician.     LABS:  Results for orders placed or performed during the hospital encounter of 07/31/20   CBC Auto Differential   Result Value Ref Range    WBC 5.1 4.5 - 11.5 E9/L    RBC 2.79 (L) 3.80 - 5.80 E12/L    Hemoglobin 8.4 (L) 12.5 - 16.5 g/dL    Hematocrit 25.0 (L) 37.0 - 54.0 %    MCV 89.6 80.0 - 99.9 fL    MCH 30.1 26.0 - 35.0 pg    MCHC 33.6 32.0 - 34.5 %    RDW 23.6 (H) 11.5 - 15.0 fL    Platelets 727 (L) 817 - 450 E9/L    MPV 12.0 7.0 - 12.0 fL    Neutrophils % 47.8 43.0 - 80.0 %    Lymphocytes % 33.9 20.0 - 42.0 %    Monocytes % 9.6 2.0 - 12.0 %    Eosinophils % 5.2 0.0 - 6.0 %    Basophils % 2.6 (H) 0.0 - 2.0 %    Neutrophils Absolute 2.50 1.80 - 7.30 E9/L    Lymphocytes Absolute 1.73 1.50 - 4.00 E9/L    Monocytes Absolute 0.51 0.10 - 0.95 E9/L    Eosinophils Absolute 0.27 0.05 - 0.50 E9/L    Basophils Absolute 0.13 0.00 - 0.20 E9/L    Myelocyte Percent 0.9 0 - 0 %    Anisocytosis 3+     Polychromasia 2+     Hypochromia 1+     Poikilocytes 2+     [place] Cells 2+     Ovalocytes 1+    Basic Metabolic Panel w/ Reflex to MG   Result Value Ref Range    Sodium 137 132 - 146 mmol/L    Potassium reflex Magnesium 5.2 (H) 3.5 - 5.0 mmol/L    Chloride 101 98 - 107 mmol/L    CO2 25 22 - 29 mmol/L    Anion Gap 11 7 - 16 mmol/L    Glucose 247 (H) 74 - 99 mg/dL    BUN 38 (H) 6 - 20 mg/dL    CREATININE 1.4 (H) 0.7 - 1.2 mg/dL    GFR Non-African American 52 >=60 mL/min/1.73    GFR African American >60     Calcium 10.5 (H) 8.6 - 10.2 mg/dL   Hepatic Function Panel   Result Value Ref Range    Total Protein 7.7 6.4 - 8.3 g/dL    Alb 3.3 (L) 3.5 - 5.2 g/dL    Alkaline Phosphatase 275 (H) 40 - 129 U/L    ALT 57 (H) 0 - 40 U/L    AST 72 (H) 0 - 39 U/L    Total Bilirubin 4.8 (H) 0.0 - 1.2 mg/dL    Bilirubin, Direct 1.3 (H) 0.0 - 0.3 mg/dL    Bilirubin, Indirect 3.5 (H) 0.0 - 1.0 mg/dL   Lipase   Result Value Ref Range    Lipase 17 13 - 60 U/L   Troponin   Result Value Ref Range    Troponin 0.02 0.00 - 0.03 ng/mL   Protime-INR   Result Value Ref Range    Protime 19.1 (H) 9.3 - 12.4 sec    INR 1.7    Urinalysis, reflex to microscopic   Result Value Ref Range    Color, UA Yellow Straw/Yellow    Clarity, UA Clear Clear    Glucose, Ur Negative Negative mg/dL    Bilirubin Urine Negative Negative    Ketones, Urine Negative Negative mg/dL    Specific Gravity, UA 1.015 1.005 - 1.030    Blood, Urine TRACE-INTACT Negative    pH, UA 6.0 5.0 - 9.0    Protein, UA Negative Negative mg/dL    Urobilinogen, Urine 0.2 <2.0 E.U./dL    Nitrite, Urine Negative Negative    Leukocyte Esterase, Urine Negative Negative   Urine Drug Screen   Result Value Ref Range Amphetamine Screen, Urine NOT DETECTED Negative <1000 ng/mL    Barbiturate Screen, Ur NOT DETECTED Negative < 200 ng/mL    Benzodiazepine Screen, Urine NOT DETECTED Negative < 200 ng/mL    Cannabinoid Scrn, Ur NOT DETECTED Negative < 50ng/mL    Cocaine Metabolite Screen, Urine NOT DETECTED Negative < 300 ng/mL    Opiate Scrn, Ur NOT DETECTED Negative < 300ng/mL    PCP Screen, Urine NOT DETECTED Negative < 25 ng/mL    Methadone Screen, Urine NOT DETECTED Negative <300 ng/mL    Oxycodone Urine NOT DETECTED Negative <100 ng/mL    FENTANYL SCREEN, URINE NOT DETECTED Negative <1 ng/mL    Drug Screen Comment: see below    Serum Drug Screen   Result Value Ref Range    Ethanol Lvl <10 mg/dL    Acetaminophen Level <5.0 (L) 10.0 - 20.4 mcg/mL    Salicylate, Serum <6.8 0.0 - 30.0 mg/dL    TCA Scrn NEGATIVE Cutoff:300 ng/mL   Lactic Acid, Plasma   Result Value Ref Range    Lactic Acid 3.3 (H) 0.5 - 2.2 mmol/L   Ammonia   Result Value Ref Range    Ammonia 117.0 (H) 16.0 - 60.0 umol/L   Microscopic Urinalysis   Result Value Ref Range    WBC, UA NONE 0 - 5 /HPF    RBC, UA 1-3 0 - 2 /HPF    Bacteria, UA RARE (A) None Seen /HPF   COVID-19   Result Value Ref Range    SARS-CoV-2, NAAT Not Detected Not Detected   Comprehensive metabolic panel   Result Value Ref Range    Sodium 138 132 - 146 mmol/L    Potassium 4.7 3.5 - 5.0 mmol/L    Chloride 103 98 - 107 mmol/L    CO2 23 22 - 29 mmol/L    Anion Gap 12 7 - 16 mmol/L    Glucose 165 (H) 74 - 99 mg/dL    BUN 34 (H) 6 - 20 mg/dL    CREATININE 1.3 (H) 0.7 - 1.2 mg/dL    GFR Non-African American 56 >=60 mL/min/1.73    GFR African American >60     Calcium 10.2 8.6 - 10.2 mg/dL    Total Protein 7.4 6.4 - 8.3 g/dL    Alb 3.1 (L) 3.5 - 5.2 g/dL    Total Bilirubin 5.4 (H) 0.0 - 1.2 mg/dL    Alkaline Phosphatase 234 (H) 40 - 129 U/L    ALT 53 (H) 0 - 40 U/L    AST 66 (H) 0 - 39 U/L   Reticulocytes   Result Value Ref Range    Retic Ct Pct 1.8 0.4 - 1.9 %    Retic Ct Abs 0.050 E12/L    Immature Retic Fract 5.2 2.3 - 13.4 %    Hematocrit 23.9 (L) 37.0 - 54.0 %    Retic HGB Equivalent 39.9 (H) 28.2 - 36.6 pg   Haptoglobin   Result Value Ref Range    Haptoglobin <10 (L) 30 - 200 mg/dL   Procalcitonin   Result Value Ref Range    Procalcitonin 0.14 (H) 0.00 - 0.08 ng/mL   Calcium, ionized   Result Value Ref Range    Calcium, Ion 1.39 (H) 1.15 - 1.33 mmol/L   Serum Drug Screen   Result Value Ref Range    Ethanol Lvl <10 mg/dL    Acetaminophen Level <5.0 (L) 10.0 - 55.8 mcg/mL    Salicylate, Serum <3.2 0.0 - 30.0 mg/dL    TCA Scrn NEGATIVE Cutoff:300 ng/mL   Urine Drug Screen   Result Value Ref Range    Amphetamine Screen, Urine NOT DETECTED Negative <1000 ng/mL    Barbiturate Screen, Ur NOT DETECTED Negative < 200 ng/mL    Benzodiazepine Screen, Urine NOT DETECTED Negative < 200 ng/mL    Cannabinoid Scrn, Ur NOT DETECTED Negative < 50ng/mL    Cocaine Metabolite Screen, Urine NOT DETECTED Negative < 300 ng/mL    Opiate Scrn, Ur NOT DETECTED Negative < 300ng/mL    PCP Screen, Urine NOT DETECTED Negative < 25 ng/mL    Methadone Screen, Urine NOT DETECTED Negative <300 ng/mL    Oxycodone Urine NOT DETECTED Negative <100 ng/mL    FENTANYL SCREEN, URINE NOT DETECTED Negative <1 ng/mL    Drug Screen Comment: see below    CBC Auto Differential   Result Value Ref Range    WBC 5.4 4.5 - 11.5 E9/L    RBC 2.61 (L) 3.80 - 5.80 E12/L    Hemoglobin 7.9 (L) 12.5 - 16.5 g/dL    MCV 91.6 80.0 - 99.9 fL    MCH 30.3 26.0 - 35.0 pg    MCHC 33.1 32.0 - 34.5 %    RDW 23.3 (H) 11.5 - 15.0 fL    Platelets 84 (L) 809 - 450 E9/L    MPV 11.7 7.0 - 12.0 fL    Neutrophils % 43.2 43.0 - 80.0 %    Immature Granulocytes % 0.4 0.0 - 5.0 %    Lymphocytes % 42.4 (H) 20.0 - 42.0 %    Monocytes % 9.0 2.0 - 12.0 %    Eosinophils % 4.1 0.0 - 6.0 %    Basophils % 0.9 0.0 - 2.0 %    Neutrophils Absolute 2.35 1.80 - 7.30 E9/L    Immature Granulocytes # 0.02 E9/L    Lymphocytes Absolute 2.30 1.50 - 4.00 E9/L    Monocytes Absolute 0.49 0.10 - 0.95 E9/L Eosinophils Absolute 0.22 0.05 - 0.50 E9/L    Basophils Absolute 0.05 0.00 - 0.20 E9/L    Anisocytosis 3+     Hypochromia 1+     Poikilocytes 2+     Schistocytes 1+     Acanthocytes 1+     [place] Cells 2+     Target Cells 1+    Platelet Confirmation   Result Value Ref Range    Platelet Confirmation SEE BELOW    POCT Glucose   Result Value Ref Range    Glucose 243 mg/dL    QC OK? okay    POCT Glucose   Result Value Ref Range    Meter Glucose 243 (H) 74 - 99 mg/dL   EKG 12 Lead   Result Value Ref Range    Ventricular Rate 89 BPM    Atrial Rate 89 BPM    P-R Interval 156 ms    QRS Duration 86 ms    Q-T Interval 400 ms    QTc Calculation (Bazett) 486 ms    P Axis 10 degrees    R Axis 48 degrees    T Axis 34 degrees   TYPE AND SCREEN   Result Value Ref Range    ABO/Rh A POS     Antibody Screen NEG        RADIOLOGY: Interpreted by Radiologist unless otherwise noted. XR SHOULDER RIGHT (MIN 2 VIEWS)   Final Result   No significant abnormal findings at either shoulder. XR SHOULDER LEFT (MIN 2 VIEWS)   Final Result   No significant abnormal findings at either shoulder. XR HIP BILATERAL W AP PELVIS (2 VIEWS)   Final Result   Unremarkable pelvis, bilateral hips and bilateral femora. XR FEMUR RIGHT (MIN 2 VIEWS)   Final Result   Unremarkable pelvis, bilateral hips and bilateral femora. XR FEMUR LEFT (MIN 2 VIEWS)   Final Result   Unremarkable pelvis, bilateral hips and bilateral femora. XR CHEST PORTABLE   Final Result   No acute cardiopulmonary findings. CT Head WO Contrast   Final Result   Mild to moderate generalized atrophy and moderate chronic   ischemic/degenerative changes in the white matter for the patient's   age. There is no acute process. Significant degenerative changes in the cervical spine most pronounced   C5-6. There is moderately severe stenosis at this level.  There are no   acute fractures      CT Cervical Spine WO Contrast   Final Result   Mild to moderate generalized atrophy and moderate chronic   ischemic/degenerative changes in the white matter for the patient's   age. There is no acute process. Significant degenerative changes in the cervical spine most pronounced   C5-6. There is moderately severe stenosis at this level. There are no   acute fractures      US ABDOMEN LIMITED    (Results Pending)       EKG: As interpreted by this ER physician. Rate: 89 bpm  Rhythm: Sinus  Axis: normal  ST Segments: no acute change  T-Waves: no acute change  Interpretation: NSR with wandering baseline; borderline EKG  Comparison: stable as compared to patient's most recent EKG on 1/7/2020    Oxygen Saturation Interpretation: Normal    Meds Given:  Medications   sodium chloride flush 0.9 % injection 10 mL (10 mLs Intravenous Given 7/31/20 2104)   sodium chloride flush 0.9 % injection 10 mL (has no administration in time range)   acetaminophen (TYLENOL) tablet 650 mg (has no administration in time range)     Or   acetaminophen (TYLENOL) suppository 650 mg (has no administration in time range)   polyethylene glycol (GLYCOLAX) packet 17 g (has no administration in time range)   promethazine (PHENERGAN) tablet 12.5 mg (has no administration in time range)     Or   ondansetron (ZOFRAN) injection 4 mg (has no administration in time range)   enoxaparin (LOVENOX) injection 40 mg (40 mg Subcutaneous Given 7/31/20 1810)   lactulose (CHRONULAC) 10 GM/15ML solution 20 g (20 g Oral Given 7/31/20 2104)   ciprofloxacin (CIPRO) IVPB 400 mg (0 mg Intravenous Stopped 7/31/20 2016)   0.9 % sodium chloride bolus (0 mLs Intravenous Stopped 7/31/20 1035)   LORazepam (ATIVAN) injection 1 mg (1 mg Intravenous Given 7/31/20 0953)   lactulose enema ( Rectal New Bag 7/31/20 1916)       Procedures:  No procedures performed. --------------------------------- PROGRESS NOTES / ADDITIONAL PROVIDER NOTES ---------------------------------  Consultations:  As outlined below.     ED Course:    ED Course as of Jul 31 2252 Fri Jul 31, 2020   0906 Nursing states that the patient will not sit still for CT. Given his past medical history and QTC of 486, we want to be careful sedating. We will give 1 of Ativan and monitor the patient's respiratory drive closely. [ML]      ED Course User Index  [ML] Vlad Reinoso [place]       4238: All results were discussed with the patient and I have provided specific details regarding the plan to admit the patient. The patient was stable at the time of admission and was without objective evidence of hemodynamic instability. He was seen in the emergency department by myself and the assigned attending physician, Dr. Roby Perry, who agreed with the assessment, plan and decision to admit as laid out herein. The patient verbalized an understanding and agreement with the plan for admission. All questions were answered and the patient was deemed to be in stable condition at the time of transport. MDM:  Patient presented from [facility] via [facility] after suffering a fall out of bed. On arrival, patient in a C-collar with no obvious injuries noted on trauma evaluation. EKG and exam were as documented above. Patient on liver transplant list; ammonia was 117. Rectal lactulose given. Transaminitis noted. CKD, but Cr appears improved from level of ~2 six months ago. Mild hyperkalemia. Serum and urine drug screen were unremarkable and the patient was COVID negative. Xrays and CT scans were negative for acute pathology. The patient was also given IVF while in the ER. BP normalized shortly after arrival and patient remained stable. Case was discussed with IM and the patient was admitted with acute hepatic encephalopathy 2/2 ESLD. He was stable at the time of transport. This patient's ED course included: a personal history and physicial examination, re-evaluation prior to disposition, multiple bedside re-evaluations, IV medications, cardiac monitoring and continuous pulse oximetry    Diagnosis:  1.  Hepatic encephalopathy ([facility] [address]    2. Altered mental status, unspecified altered mental status type    3. Closed head injury, initial encounter        Disposition:  Patient's disposition: Admit to telemetry  Patient's condition is stable. This patient was seen, examined and treated with Dr. Bobby Corley. All aspects of the patient's care were discussed with the attending physician.        Michaelle Daugherty DO  Resident  07/31/20 5280

## 2020-07-31 NOTE — H&P
[address] [address]  Internal Medicine Residency Program  History and Physical    Patient:  Willem Gan 62 y.o. male MRN: [id number]     Date of Service: 7/31/2020    Hospital Day: 1      Chief Complaint: Fall (fell out of bed this morning at facility, brought by staff with little info from firefighters at scene or facility). Altered mental status. History of Present Illness   The patient is a 63 y/o male with a PMHx of cirrhosis, EtOH abuse, TIA, CKD , HTN, HLD, DM and anemia who presents with a fall and AMS. Per EMS, the patient was at [facility] and rolled out of bed onto the floor and was found to have altered mental status. Wife was at the bedside and provided history. Patient had a fall and a subdural hematoma in January 2020 and has been in and out of the hospital since. He has been in [facility] for about 1 week after being discharged from a recent hospitalization. She says he has normal mental status at baseline. She talks to the patient on the phone multiple times per day and reports that yesterday he seemed normal and did not have any specific complaints. At this time the patient is unresponsive and not following commands. She says that since January his ammonia intermittently becomes elevated and he becomes slightly confused but nothing similar to this current issue. She also notes that he has had severe anemia, leukopenia, and thrombocytopenia and had a EGD, colonoscopy, and pill endoscopy with no source of bleeding identified. He had a bone marrow biopsy done 3 weeks ago but she does not know the results. He chews tobacco but stopped using it a few weeks ago. Wife reports that his last alcoholic drink was in January 2020. No illicit drug use. ED Course: No acute injury on XR of Hips, femurs, shoulders. No acute process on CT head or CT C-spine. Labs notable for: Potassium 5.2. Creatinine 1.4. Lactic acid 3.3. Calcium 10.5. Troponin negative 0.02.  Ammonia elevated 117. Alk phos 275, tBili 4.8, direct 1.3, indirect 3.5. UDS negative. Serum EtoH <10. Hgb 8.4 MCV 89.6. Platelets 818. UA negative. ED Meds: Patient was given Ativan 1 mg IV  ED Fluids: Patient was given NS 1 L bolus    Past Medical History:      Diagnosis Date    Diabetes mellitus ([address]     diet controlled    History of blood transfusion 12/2014    during back surgery    Hyperlipidemia     Hypertension        Past Surgical History:        Procedure Laterality Date    BACK SURGERY  2014    fusion lumbar    ELBOW SURGERY  2014    aspirated with infection     HERNIA REPAIR      OTHER SURGICAL HISTORY  6 16 16    stage 1 4 day percutaneous trial medtronic lumbar spinal cord stimulator    OTHER SURGICAL HISTORY N/A 10/10/2016    surgical implantation medtronic lumbar spinal cord stimulator electrodfe and generator       Medications Prior to Admission:    Prior to Admission medications    Medication Sig Start Date End Date Taking? Authorizing Provider   folic acid (FOLVITE) 1 MG tablet Take 1 mg by mouth daily    Historical Provider, MD   cyanocobalamin 1000 MCG/ML injection Inject 1,000 mcg into the muscle every 7 days    Historical Provider, MD   lisinopril (PRINIVIL;ZESTRIL) 20 MG tablet Take 40 mg by mouth daily     Historical Provider, MD   metoprolol tartrate (LOPRESSOR) 50 MG tablet Take 50 mg by mouth 2 times daily     Historical Provider, MD       Allergies:  Patient has no known allergies. Social History:   TOBACCO:   reports that he has quit smoking. He quit after 4.00 years of use. His smokeless tobacco use includes chew. ETOH: Last alcohol use in January 2020. Family History:   No family history on file. REVIEW OF SYSTEMS:  Review of Systems   Unable to perform ROS: Mental status change   Psychiatric/Behavioral: Positive for confusion.      Physical Exam     Vitals: /82   Pulse 100   Temp 97.5 °F (36.4 °C) (Temporal)   Resp 16   Wt 185 lb (83.9 kg)   SpO2 98%   BMI 23.75 kg/m²      General Appearance: frail-appearing and not responsive to voice and not following commands. Does not open eyes. Moves all extremities.  Skin: Mildly jaundiced and palmar erythema   Head: normocephalic and atraumatic   Eyes: sclera anicteric. Both eyes rolled upwards. Right pupil reactive to light. Left pupil dilated and not reactive to light.  Neck: Neck supple. No JVD. No hepatojugular reflex seen.  Pulmonary/Chest: clear to auscultation bilaterally- no wheezes, rales or rhonchi, normal air movement, no respiratory distress   Cardiovascular: normal rate, normal S1 and S2, no gallops and intact distal pulses   Abdomen: soft, non-tender, non-distended, normal bowel sounds, no masses or organomegaly. Not dull to percussion. No fluid wave.  Extremities: no cyanosis or edema   Musculoskeletal: normal range of motion, no joint swelling, deformity or tenderness   Neurologic: Not responsive or following commands. 1+ patellar reflexes bilaterally. Negative babinski bilaterally. Not withdrawing to painful stimuli in the upper or lower extremities.     Labs and Imaging Studies     Basic Labs:  Recent Labs     07/31/20 0911 07/31/20 0921   NA  --  137   K  --  5.2*   CL  --  101   CO2  --  25   BUN  --  38*   CREATININE  --  1.4*   GLUCOSE 243 247*   CALCIUM  --  10.5*       Recent Labs     07/31/20 0921   WBC 5.1   RBC 2.79*   HGB 8.4*   HCT 25.0*   MCV 89.6   MCH 30.1   MCHC 33.6   RDW 23.6*   *   MPV 12.0      7/31/2020 09:21   Troponin 0.02      7/31/2020 09:21   Albumin 3.3 (L)   Alk Phos 275 (H)   ALT 57 (H)   Ammonia 117.0 (H)   AST 72 (H)   Bilirubin 4.8 (H)   Bilirubin, Direct 1.3 (H)   Bilirubin, Indirect 3.5 (H)   Lipase 17   Total Protein 7.7      7/31/2020 09:21   Ethanol Lvl <10   TCA Scrn NEGATIVE   Amphetamine Screen, Urine NOT DETECTED   Barbiturate Screen, Ur NOT DETECTED   Benzodiazepine Screen, Urine NOT DETECTED   Cannabinoid Scrn, Ur NOT DETECTED   Methadone Screen, Urine NOT DETECTED   Opiate Scrn, Ur NOT DETECTED   PCP Screen, Urine NOT DETECTED   Cocaine Metabolite Screen, Urine NOT DETECTED   FENTANYL SCREEN, URINE NOT DETECTED   Oxycodone Urine NOT DETECTED     Imaging Studies:     Xr Shoulder Right (min 2 Views)    Result Date: 2020  Patient MRN:  [id number] : 1960 Age: 62 years Gender: Male Order Date:  2020 9:00 AM EXAM: XR SHOULDER RIGHT (MIN 2 VIEWS), XR SHOULDER LEFT (MIN 2 VIEWS) NUMBER OF IMAGES:  4 INDICATION: Acute fall with bilateral shoulder pain. Trauma COMPARISON: None TECHNIQUE: 2 view right shoulder and two-view left shoulder. FINDINGS: No acute fracture or dislocation. Soft tissues unremarkable. Bones normally mineralized. No significant abnormal findings at either shoulder. Xr Hip Bilateral W Ap Pelvis (2 Views)    Result Date: 2020  Patient MRN:  [id number] : 1960 Age: 62 years Gender: Male Order Date:  2020 9:15 AM EXAM: XR HIP BILATERAL W AP PELVIS (2 VIEWS), XR FEMUR RIGHT (MIN 2 VIEWS), XR FEMUR LEFT (MIN 2 VIEWS) NUMBER OF IMAGES:  7 INDICATION: Acute fall with pelvic, bilateral hips and bilateral femur pain. Trauma COMPARISON: Pelvis dated 2020 TECHNIQUE: Bilateral hips and pelvis 3 views, right femur 2 views and left femur 2 views FINDINGS: No acute fracture or dislocation. Bones are normally mineralized. Hip joint spaces are relatively preserved. There are unremarkable soft tissues. Unremarkable pelvis, bilateral hips and bilateral femora. Xr Femur Left (min 2 Views)    Result Date: 2020  Patient MRN:  [id number] : 1960 Age: 62 years Gender: Male Order Date:  2020 9:15 AM EXAM: XR HIP BILATERAL W AP PELVIS (2 VIEWS), XR FEMUR RIGHT (MIN 2 VIEWS), XR FEMUR LEFT (MIN 2 VIEWS) NUMBER OF IMAGES:  7 INDICATION: Acute fall with pelvic, bilateral hips and bilateral femur pain.  Trauma COMPARISON: Pelvis dated 2020 TECHNIQUE: Bilateral hips and pelvis 3 views, right femur 2 views and left femur 2 views FINDINGS: No acute fracture or dislocation. Bones are normally mineralized. Hip joint spaces are relatively preserved. There are unremarkable soft tissues. Unremarkable pelvis, bilateral hips and bilateral femora. Xr Femur Right (min 2 Views)    Result Date: 2020  Patient MRN:  [id number] : 1960 Age: 62 years Gender: Male Order Date:  2020 9:15 AM EXAM: XR HIP BILATERAL W AP PELVIS (2 VIEWS), XR FEMUR RIGHT (MIN 2 VIEWS), XR FEMUR LEFT (MIN 2 VIEWS) NUMBER OF IMAGES:  7 INDICATION: Acute fall with pelvic, bilateral hips and bilateral femur pain. Trauma COMPARISON: Pelvis dated 2020 TECHNIQUE: Bilateral hips and pelvis 3 views, right femur 2 views and left femur 2 views FINDINGS: No acute fracture or dislocation. Bones are normally mineralized. Hip joint spaces are relatively preserved. There are unremarkable soft tissues. Unremarkable pelvis, bilateral hips and bilateral femora. Ct Head Wo Contrast    Result Date: 2020  Patient MRN:  [id number] : 1960 Age: 62 years Gender: Male Order Date:  2020 9:00 AM EXAM: CT HEAD WO CONTRAST, CT CERVICAL SPINE WO CONTRAST Technique: Low-dose CT  acquisition technique included one of following options; 1 . Automated exposure control, 2. Adjustment of MA and or KV according to patient's size. 3. Use of interactive reconstruction. Dosage: 1822 mGy-cm. INDICATION:  Trauma Trauma COMPARISON: 2020 FINDINGS:  There is moderate chronic ischemic/degenerative change in the white matter for patient's age. There is some mild to moderate generalized atrophy. There is no mass effect edema or hemorrhage. There is complete opacification of the left maxillary sinus and considerable opacification of the left side of the sphenoid sinus. There is a small amount mucosal thickening left ethmoid air cells. No skull fractures are noted.  There is slight anterior subluxation of C3 with respect to  C4 felt to be degenerative. There is slight anterior subluxation of C4 with respect to C5 felt to be degenerative. There is significant disc space narrowing at C5-6 and C6-7. There is focal kyphosis in this region. There are significant anterior spurs. There is slight retrolisthesis of C5 with respect to C6 felt to be degenerative. There is mild chronic compression deformity of the caudal endplate of C5 and superior endplate of C6. There are no fractures. At C2-3 there is right-sided facet arthrosis of a moderate degree. There are posterior spurs. Some right-sided facet arthrosis C3 -4 with mild stenosis and right foraminal narrowing. At C4-5 there is significant right-sided facet arthrosis. There is right lateral spurs. Mild stenosis and right foraminal narrowing. At C5-6 diffuse posterior spurs causes moderate to moderately severe stenosis. At C6-7 small posterior spurs cause mild stenosis    Mild to moderate generalized atrophy and moderate chronic ischemic/degenerative changes in the white matter for the patient's age. There is no acute process. Significant degenerative changes in the cervical spine most pronounced C5-6. There is moderately severe stenosis at this level. There are no acute fractures    Ct Cervical Spine Wo Contrast    Result Date: 2020  Patient MRN:  [id number] : 1960 Age: 62 years Gender: Male Order Date:  2020 9:00 AM EXAM: CT HEAD WO CONTRAST, CT CERVICAL SPINE WO CONTRAST Technique: Low-dose CT  acquisition technique included one of following options; 1 . Automated exposure control, 2. Adjustment of MA and or KV according to patient's size. 3. Use of interactive reconstruction. Dosage: 1822 mGy-cm. INDICATION:  Trauma Trauma COMPARISON: 2020 FINDINGS:  There is moderate chronic ischemic/degenerative change in the white matter for patient's age. There is some mild to moderate generalized atrophy. There is no mass effect edema or hemorrhage.  There is complete opacification of the left maxillary sinus and considerable opacification of the left side of the sphenoid sinus. There is a small amount mucosal thickening left ethmoid air cells. No skull fractures are noted. There is slight anterior subluxation of C3 with respect to  C4 felt to be degenerative. There is slight anterior subluxation of C4 with respect to C5 felt to be degenerative. There is significant disc space narrowing at C5-6 and C6-7. There is focal kyphosis in this region. There are significant anterior spurs. There is slight retrolisthesis of C5 with respect to C6 felt to be degenerative. There is mild chronic compression deformity of the caudal endplate of C5 and superior endplate of C6. There are no fractures. At C2-3 there is right-sided facet arthrosis of a moderate degree. There are posterior spurs. Some right-sided facet arthrosis C3 -4 with mild stenosis and right foraminal narrowing. At C4-5 there is significant right-sided facet arthrosis. There is right lateral spurs. Mild stenosis and right foraminal narrowing. At C5-6 diffuse posterior spurs causes moderate to moderately severe stenosis. At C6-7 small posterior spurs cause mild stenosis    Mild to moderate generalized atrophy and moderate chronic ischemic/degenerative changes in the white matter for the patient's age. There is no acute process. Significant degenerative changes in the cervical spine most pronounced C5-6. There is moderately severe stenosis at this level. There are no acute fractures    Xr Shoulder Left (min 2 Views)    Result Date: 2020  Patient MRN:  [id number] : 1960 Age: 62 years Gender: Male Order Date:  2020 9:00 AM EXAM: XR SHOULDER RIGHT (MIN 2 VIEWS), XR SHOULDER LEFT (MIN 2 VIEWS) NUMBER OF IMAGES:  4 INDICATION: Acute fall with bilateral shoulder pain. Trauma COMPARISON: None TECHNIQUE: 2 view right shoulder and two-view left shoulder. FINDINGS: No acute fracture or dislocation. Soft tissues unremarkable.  Bones normally mineralized. No significant abnormal findings at either shoulder. Xr Chest Portable    Result Date: 2020  Patient MRN: [id number] : 1960 Age:  62 years Gender: Male Order Date: 2020 9:00 AM Exam: XR CHEST PORTABLE Number of Images: 1 view Indication:  Acute fall with chest Trauma Comparison: 2020 FINDINGS: Indwelling T-spine neurostimulator. Heart and pulmonary vascularity normal. Lungs clear. Costophrenic angles sharp. Normal aorta. No acute cardiopulmonary findings. EKG: normal sinus rhythm, unchanged from previous tracings. Resident's Assessment and Plan   Graham Silver is a 63 y/o male with a PMHx of cirrhosis, EtOH abuse, TIA, CKD, HTN, HLD, DM and anemia who presents with a fall and AMS. 1. Acute encephalopathy  2/2 hepatic encephalopathy vs infection (SBP) vs trauma vs CVA vs substance intoxication/withdrawal. Likely hepatic encephalopathy in the setting of cirrhosis and elevated ammonia. Less likely SBP with no obvious ascites on exam and no fever or elevated WBC. Less likely CVA with no acute findings on CTH and no focal deficits. Less likely due to substance with negative urine and serum EtOH.  Follow ammonia level   Lactulose PO and enema   Urine and serum drug screen   Abd US to assess for ascites   Cipro 400 mg IV Q12H for SBP prophylaxis    2. LAURO on CKD  Cr 1.2 in 2020. Cr 1.4 today. Unclear what stage of CKD, GFR >60 in January. Received 1 L NS bolus in the ED.  BMP at 2000   Monitor UOP    3. Fall  Fall from bed. CTH and CT C-spine showed no acute abnormality. X-rays showed no acute injury. Less likely to be intracranial hemorrhage. 4. Hyperkalemia  Potassium 5.2. Likely 2/2 LAURO.  Daily BMP    5. Hypercalcemia  Calcium 10.5.  Ionized calcium level    6. Chronic normocytic anemia  Wife reports that recent EGD, colonoscopy, and pill endoscopy did not identify bleeding source.  Bone marrow biopsy a few weeks ago, results unknown.  Iron studies, reticulocytes, haptoglobin, peripheral blood smear    7. Hx of cirrhosis  2/2 EtOH. Elevated ammonia. No obvious ascites on physical exam. No fever or elevated WBC.  Abd US and antibiotics as above    8. Hx of DM  Glucose 247. Per med hx on Lantus 15u QHS and 30u QAM and Glipizide 2.5 mg QAM.    9. Hx of HTN  /70. On Lopressor 25 mg BID at home. Hold home meds for now.     PT/OT evaluation: Not indicated at this time  DVT prophylaxis/ GI prophylaxis: Lovenox  Disposition: Admission to House Team 2    William Martinez MD, PGY-1  Attending Physician: Dr. Tanisha Banks

## 2020-07-31 NOTE — ED PROVIDER NOTES
Systems:   Unable to be obtained secondary to mental status    --------------------------------------------- PAST HISTORY ---------------------------------------------  Past Medical History:  has a past medical history of Diabetes mellitus ([facility] [address] History of blood transfusion, Hyperlipidemia, and Hypertension. Past Surgical History:  has a past surgical history that includes back surgery (2014); Elbow surgery (2014); other surgical history (6 16 16); hernia repair; and other surgical history (N/A, 10/10/2016). Social History:  reports that he has quit smoking. He quit after 4.00 years of use. His smokeless tobacco use includes chew. He reports current alcohol use. He reports that he does not use drugs. Family History: family history is not on file. Unless otherwise noted, family history is non contributory    The patients home medications have been reviewed. Allergies: Patient has no known allergies. Physical Exam:  Constitutional/General: Alert disoriented  Head: Normocephalic and atraumatic  Eyes: PERRL, EOMI, sclera icteric  Mouth: Oropharynx clear, handling secretions  Neck: Supple, full ROM, no stridor, no meningeal signs  Respiratory: Lungs clear to auscultation bilaterally, no wheezes, rales, or rhonchi. Not in respiratory distress  Cardiovascular:  Regular rate. Regular rhythm. No murmurs, no aortic murmurs, no gallops, or rubs. 2+ distal pulses. Equal extremity pulses. GI:  Abdomen Soft, Non tender, Non distended. No rebound, guarding, or rigidity. No pulsatile masses. Musculoskeletal: Moves all extremities x 4. Warm and well perfused,   No edema. Palpable peripheral pulses  Integument: skin warm and dry. No rashes.    Neurologic: GCS 13, no focal deficits  Psychiatric: Normal Affect      I directly supervised any procedures performed by the resident and was present for the procedure including all critical portions of the procedure      I, Dr. Jesse Sanders, am the primary provider of

## 2020-07-31 NOTE — PROGRESS NOTES
Patient admitted into [facility]. Micheal Gibson, [address] Cammy Singh at [phone], notified of our need for patient's medication list to be faxed to floor.

## 2020-07-31 NOTE — PROGRESS NOTES
Telesitter ordered. Medication list received from [facility]. Patient is on a low sweets, YARELY regular consistency diet with thin liquids at nursing home.

## 2020-07-31 NOTE — LETTER
Beneficiary Notification Letter  BPCI Advanced     Your Doctor or [address],    We wanted to let you know that your health care provider, [address], has volunteered to take part in our [facility] for [facility] & Medicaid Services (CMS) Bundled Payments for [facility] (BPCI Advanced). This doesnt change your Medicare rights or benefits and you dont need to do anything. What are bundled payments? A bundled payment combines, or bundles together, payments that Medicare makes to your health care providers for the many different kinds of medical services you might get in a specific time period. In BPCI Advanced, this time period could include a hospital inpatient stay or outpatient procedure, plus 90 days. Why would Medicare bundle payments? Bundled payments are thought of as a value-based way to pay because health care providers are responsible for both the quality and cost of medical care they give. This is a relatively new way of paying health care providers compared to thefee-for-service way Medicare has traditionally paid, where providers are paid separately for each service they provide. Bundled payments encourage these providers to work together to provide better, more coordinated care during your hospital stay, or outpatient procedure, and through your recovery. What does BPCI Advance mean for me? Youre more likely to get even better care when hospitals, doctors, and other health care providers work together. In BPCI Advanced, hospitals, doctors, and other health care providers may be rewarded for providing better, more coordinated health care. Medicare will watch BPCI Advanced participants closely to make sure that you and other patients keep getting efficient, high quality care. What do I need to know about BPCI Advanced?   Whats most important for you to know is that your Medicare rights and benefits wont change because your health care provider is participating in [facility]. Medicare will keep covering all of your medically necessary services. Even though Medicare will pay your doctor in a different way under BPCI Advanced, how much you have to pay wont change. Health care providers and suppliers who are enrolled in Medicare will submit their Medicare claims like they always have. Youll have all the same Medicare rights and protections, including the right to choose which hospital, doctor, or other health care provider you see. If you dont want to get care from a health care provider whos participating in [address] [place], then youll have to choose a different health care provider whos not participating in the Model. How can I give feedback about my health care? Medicare might ask you to take a voluntary survey about the services and care you received from [address] [place] [address] during your hospital stay or outpatient procedure and for a specific period of time afterwards. You can decide whether you want to take the voluntary survey, but if you do, itll help Medicare make BPCI Advanced and the care of other Medicare patients better. If you have concerns or complaints about your care, you can:   · Talk to your doctor or health care provider. · Contact your Beneficiary and Family Centered Care Quality Improvement   Organization [facility]OW[facility]). You can get your [employer]-QIOs phone number  at  Medicare.gov/contacts or by calling 1-800-MEDICARE. TTY users can call  3-800.914.9838. Where can I learn more about BPCI Advanced? Learn more about BPCI Advanced at https://innovation.cms.gov/initiatives/bpci-advanced/:  · A list of all the hospitals and physician group practices in the country participating in [address] [place] [address]. · All of the inpatient and outpatient Clinical Episodes that are currently included under BPCI Advanced.  A Clinical Episode is a grouping of medical conditions or diagnoses that are included in the [address].

## 2020-08-01 ENCOUNTER — APPOINTMENT (OUTPATIENT)
Dept: ULTRASOUND IMAGING | Age: 60
DRG: 441 | End: 2020-08-01
Payer: MEDICARE

## 2020-08-01 LAB
AMMONIA: 46 UMOL/L (ref 16–60)
ANION GAP SERPL CALCULATED.3IONS-SCNC: 12 MMOL/L (ref 7–16)
BUN BLDV-MCNC: 34 MG/DL (ref 6–20)
CALCIUM SERPL-MCNC: 9.5 MG/DL (ref 8.6–10.2)
CHLORIDE BLD-SCNC: 99 MMOL/L (ref 98–107)
CO2: 19 MMOL/L (ref 22–29)
CREAT SERPL-MCNC: 1.3 MG/DL (ref 0.7–1.2)
GFR AFRICAN AMERICAN: >60
GFR NON-AFRICAN AMERICAN: 56 ML/MIN/1.73
GLUCOSE BLD-MCNC: 669 MG/DL (ref 74–99)
LACTIC ACID: 2.1 MMOL/L (ref 0.5–2.2)
METER GLUCOSE: >500 MG/DL (ref 74–99)
POTASSIUM SERPL-SCNC: 4.2 MMOL/L (ref 3.5–5)
SODIUM BLD-SCNC: 130 MMOL/L (ref 132–146)

## 2020-08-01 PROCEDURE — 6360000002 HC RX W HCPCS: Performed by: INTERNAL MEDICINE

## 2020-08-01 PROCEDURE — 82140 ASSAY OF AMMONIA: CPT

## 2020-08-01 PROCEDURE — 83605 ASSAY OF LACTIC ACID: CPT

## 2020-08-01 PROCEDURE — 36415 COLL VENOUS BLD VENIPUNCTURE: CPT

## 2020-08-01 PROCEDURE — 2580000003 HC RX 258: Performed by: INTERNAL MEDICINE

## 2020-08-01 PROCEDURE — 82728 ASSAY OF FERRITIN: CPT

## 2020-08-01 PROCEDURE — 99231 SBSQ HOSP IP/OBS SF/LOW 25: CPT | Performed by: INTERNAL MEDICINE

## 2020-08-01 PROCEDURE — 83550 IRON BINDING TEST: CPT

## 2020-08-01 PROCEDURE — 83540 ASSAY OF IRON: CPT

## 2020-08-01 PROCEDURE — 83930 ASSAY OF BLOOD OSMOLALITY: CPT

## 2020-08-01 PROCEDURE — 2060000000 HC ICU INTERMEDIATE R&B

## 2020-08-01 PROCEDURE — 82962 GLUCOSE BLOOD TEST: CPT

## 2020-08-01 PROCEDURE — 76705 ECHO EXAM OF ABDOMEN: CPT

## 2020-08-01 PROCEDURE — 82010 KETONE BODYS QUAN: CPT

## 2020-08-01 PROCEDURE — 6370000000 HC RX 637 (ALT 250 FOR IP): Performed by: INTERNAL MEDICINE

## 2020-08-01 PROCEDURE — 80048 BASIC METABOLIC PNL TOTAL CA: CPT

## 2020-08-01 RX ADMIN — LACTULOSE 20 G: 20 SOLUTION ORAL at 08:52

## 2020-08-01 RX ADMIN — RIFAXIMIN 200 MG: 200 TABLET ORAL at 20:39

## 2020-08-01 RX ADMIN — RIFAXIMIN 200 MG: 200 TABLET ORAL at 14:54

## 2020-08-01 RX ADMIN — SODIUM CHLORIDE, PRESERVATIVE FREE 10 ML: 5 INJECTION INTRAVENOUS at 20:39

## 2020-08-01 RX ADMIN — CIPROFLOXACIN 400 MG: 2 INJECTION, SOLUTION INTRAVENOUS at 05:12

## 2020-08-01 RX ADMIN — CIPROFLOXACIN 400 MG: 2 INJECTION, SOLUTION INTRAVENOUS at 18:42

## 2020-08-01 RX ADMIN — LACTULOSE 20 G: 20 SOLUTION ORAL at 13:32

## 2020-08-01 RX ADMIN — SODIUM CHLORIDE, PRESERVATIVE FREE 10 ML: 5 INJECTION INTRAVENOUS at 08:53

## 2020-08-01 RX ADMIN — LACTULOSE 20 G: 20 SOLUTION ORAL at 20:39

## 2020-08-01 RX ADMIN — ENOXAPARIN SODIUM 40 MG: 40 INJECTION SUBCUTANEOUS at 08:52

## 2020-08-01 RX ADMIN — RIFAXIMIN 200 MG: 200 TABLET ORAL at 08:52

## 2020-08-01 NOTE — PROGRESS NOTES
No pharmacy selected by patient's wife, Harshal Has are from [facility] and patient is currently in [facility]! \"

## 2020-08-01 NOTE — PROGRESS NOTES
Attempted ultrasound 7/31 - patient refusing at this time.  Spoke to RN, will try again tomorrow 8/1/20

## 2020-08-01 NOTE — PROGRESS NOTES
[facility]  Internal Medicine Residency Program  Progress Note - House Team 2    Patient:  Olya Oscar 62 y.o. male MRN: [id number]     Date of Service: 8/1/2020     CC: altered mental status, fall  Overnight events: combative at night, needed a sitter    Subjective     Mr. Iqra Cash was not able to wake up when I went to see him. I was not able to take a good history or physical or ask him how he was feeling. He did wake up when the team rounded, and he was able to demonstrate asterixis. He did not say anything other than that he needed to urinate. He seemed altered but more conscious than he was on presentation on 7/31. Objective     Physical Exam: unable to perform due to AMS  · Vitals: BP (!) 114/57   Pulse 102   Temp 99.2 °F (37.3 °C) (Oral)   Resp 16   Ht 6' 2\" (1.88 m)   Wt 151 lb (68.5 kg)   SpO2 98%   BMI 19.39 kg/m²     · I & O - 24hr: I/O this shift:  · In: 10 [I.V.:10]  · Out: -    · General Appearance: cachectic, fatigued and icteric  · HEENT:  Head: Normocephalic, no lesions, without obvious abnormality. · Lung: clear to auscultation bilaterally  · Musculokeletal: No joint swelling, no muscle tenderness. ROM normal in all joints of extremities.    · Neurologic: Mental status: Not alert, unable to determine orientation due to mental status change  Subject  Pertinent Labs & Imaging Studies   evelyne  CBC with Differential:    Lab Results   Component Value Date    WBC 5.4 07/31/2020    RBC 2.61 07/31/2020    HGB 7.9 07/31/2020    HCT 23.9 07/31/2020    PLT 84 07/31/2020    MCV 91.6 07/31/2020    MCH 30.3 07/31/2020    MCHC 33.1 07/31/2020    RDW 23.3 07/31/2020    NRBC 0.0 01/29/2020    METASPCT 2.0 01/07/2020    LYMPHOPCT 42.4 07/31/2020    MONOPCT 9.0 07/31/2020    MYELOPCT 0.9 07/31/2020    BASOPCT 0.9 07/31/2020    MONOSABS 0.49 07/31/2020    LYMPHSABS 2.30 07/31/2020    EOSABS 0.22 07/31/2020    BASOSABS 0.05 07/31/2020     BMP:    Lab Results   Component Value Date    NA 138 07/31/2020    K 4.7 07/31/2020    K 5.2 07/31/2020     07/31/2020    CO2 23 07/31/2020    BUN 34 07/31/2020    LABALBU 3.1 07/31/2020    LABALBU 2.4 01/07/2020    CREATININE 1.3 07/31/2020    CALCIUM 10.2 07/31/2020    GFRAA >60 07/31/2020    LABGLOM 56 07/31/2020    LABGLOM 24 01/07/2020    GLUCOSE 165 07/31/2020    GLUCOSE 216 01/07/2020     Hepatic Function Panel:    Lab Results   Component Value Date    ALKPHOS 234 07/31/2020    ALT 53 07/31/2020    AST 66 07/31/2020    PROT 7.4 07/31/2020    BILITOT 5.4 07/31/2020    BILIDIR 1.3 07/31/2020    IBILI 3.5 07/31/2020    LABALBU 3.1 07/31/2020    LABALBU 2.4 01/07/2020     Ionized Calcium:  No results found for: IONCA  PT/INR:    Lab Results   Component Value Date    PROTIME 19.1 07/31/2020    INR 1.7 07/31/2020     Warfarin PT/INR:  No components found for: PTPATWAR, PTINRWAR  PTT:    Lab Results   Component Value Date    APTT 37.8 01/07/2020   [APTT}  U/A:    Lab Results   Component Value Date    COLORU Yellow 07/31/2020    PROTEINU Negative 07/31/2020    PHUR 6.0 07/31/2020    WBCUA NONE 07/31/2020    RBCUA 1-3 07/31/2020    BACTERIA RARE 07/31/2020    CLARITYU Clear 07/31/2020    SPECGRAV 1.015 07/31/2020    LEUKOCYTESUR Negative 07/31/2020    UROBILINOGEN 0.2 07/31/2020    BILIRUBINUR Negative 07/31/2020    BLOODU TRACE-INTACT 07/31/2020    GLUCOSEU Negative 07/31/2020       Student's Assessment and Plan     Gallo Richter is a 62 y.o. male with PMHx of Wernicke's encephalopathy, alcohol abuse, liver failure, HTN, HLD, and T2DM who presented to the ED on 7/31 from an outside rehab facility due to altered mental status and fall. His GCS was 9 on presentation. Head/neck CT showed no acute processes but moderate to severe stenosis of C5-6 and moderate generalized atrophy and chronic ischemic changes in the white matter. XR of shoulder, hip, chest, and femur/pelvis showed no acute processes.  EKG showed normal sinus rhythm with prolonged QT interval. Troponin 0.02 once. 1. Acute Encephalopathy 2/2 metabolic derangement (hepatic encephalopathy/ hyponatremia) vs infection (spontaneous bacterial peritonitis) vs other (GI bleed/ renal failure/ portal vein thrombosis)      GCS 9 on admission. Head/neck CT and shoulder, hip, chest, femur/ pelvis XR showed no acute processes. Upper extremity muscle rigidity present on PE. UDS negative x2. Ethanol level >10 x2. Follow up with urine and blood cultures.                  - BUN 34 and Cr 1.3              - WBC 5.4              - Ammonia 117- continue to trend              - Patient refused abdominal ultrasound 7/31, try again later this morning               - Rule out infection- CRP, ESR   - Continue lactulose and rifaximin- suspect this is ammonia related   - Ciprofloxacin 400 mg BID for SBP prophylaxis     2. Fall  From bed. No acute processes on CT of head/neck or XR of shoulder, chest, hip, femur. Not likely intracranial hemorrhage. 3. Mild Hyperkalemia 2/2 CKD Stage III vs LAURO vs insulin deficiency - resolved  Potassium 4.7, GFR 56, Cr 1.3, glucose 165.     4. Lactic acidosis - resolved (3.3 > 2.1)     5. Mild Hypercalcemia possibly 2/2 to long periods of immobilization vs Vit D supplementation                 - Vit D 1.25 mg weekly               - Monitor daily labs              - Ca 10.5 > 10.2   - Elevated ionized calcium- 1.39     6. Ho Cirrhosis and Liver Failure 2/2 alcohol use disorder      Patient noted to be jaundiced on physical exam with palmar erythema. Supplementing folic acid 1 mg daily, B12 1000 mcg every 2 weeks, vit D 1.25 mg weekly. PT 19.1, INR within normal limits.                  - Alk phos trending down at 275 > 234              - AST/ ALT trending down --> AST 72 > 66 and ALT 57 > 53              - Albumin 3.3 > 3.1              - Bilirubin 4.8, direct 1.3, indirect 3.5              - Lactulose 10 g QID              - Rifaximin 550 mg BID      7.  Ho Normocytic Normochromic Anemia 2/2 CKD stage III vs liver cirrhosis     Pt was being worked up in [place], bone marrow biopsy in June. Have yet to go over results. Was needing transfusions every ~3 weeks. Elevated RDW.                  - Hemoglobin down-trending but may be dilutional- 8.4 > 7.9   - Low haptoglobin- <10   - Retic count within normal limits   - F/u with blood smear              - Follow CBC, transfuse if <7     8. Ho CDK Stage III                  - GFR 56              - Cr trending down- 1.4 > 1.3   - Cr baseline 1.2 in Jan 2020              - Monitor urine output               - Follow kidney function     9. Ho HTN- /58- hold home meds for now, takes Lopressor 25 mg BID      10. Ho Type II DM- glucose 243 on admission                 - Glipizide 2.5 mg daily               - Lantus 30 units AM, 15 units PM     11. Ho Alcohol Use Disorder- ethanol <10 x2     12.  Ho HLD                 - Simvastatin 40 mg in the past, not currently taking it              - F/u with lipid panel       PT/OT evaluation: not at this time  DVT prophylaxis/ GI prophylaxis: Lovenox  Disposition: telometry     Aida Hurtado, MS3  Attending physician: Dr. Lillie Valencia

## 2020-08-01 NOTE — PROGRESS NOTES
[address] [facility]  Internal Medicine Residency / [address]    Attending Physician Statement  I have discussed the case, including pertinent history and exam findings with the resident and the team.  I have seen and examined the patient and the key elements of the encounter have been performed by me. I agree with the assessment, plan and orders as documented by the resident. Stuporous with occasional lucid interval  Bedfast   Ruling out sepsis   NH3 118  Chronic Liver failure \Extensive muscle  wasting   Presently NPO  Able to witness asterixis during  a lucid  Interval  Meds reviewed  Unable to eat at present  Plan: Continue Rifaximin and lactulose    May consider giving meds during a lucid interval    Remainder of medical problems as per resident note.       Phil Merida  Internal Medicine Residency Faculty

## 2020-08-02 ENCOUNTER — APPOINTMENT (OUTPATIENT)
Dept: CT IMAGING | Age: 60
DRG: 441 | End: 2020-08-02
Payer: MEDICARE

## 2020-08-02 LAB
ALBUMIN SERPL-MCNC: 2.6 G/DL (ref 3.5–5.2)
ALP BLD-CCNC: 161 U/L (ref 40–129)
ALT SERPL-CCNC: 48 U/L (ref 0–40)
ANION GAP SERPL CALCULATED.3IONS-SCNC: 14 MMOL/L (ref 7–16)
AST SERPL-CCNC: 54 U/L (ref 0–39)
B.E.: -5.3 MMOL/L (ref -3–3)
BETA-HYDROXYBUTYRATE: 0.09 MMOL/L (ref 0.02–0.27)
BILIRUB SERPL-MCNC: 5.1 MG/DL (ref 0–1.2)
BILIRUBIN DIRECT: 1.2 MG/DL (ref 0–0.3)
BILIRUBIN, INDIRECT: 3.9 MG/DL (ref 0–1)
BUN BLDV-MCNC: 33 MG/DL (ref 6–20)
CALCIUM SERPL-MCNC: 9.5 MG/DL (ref 8.6–10.2)
CHLORIDE BLD-SCNC: 100 MMOL/L (ref 98–107)
CO2: 20 MMOL/L (ref 22–29)
COHB: 1.4 % (ref 0–1.5)
CREAT SERPL-MCNC: 1.3 MG/DL (ref 0.7–1.2)
CRITICAL: ABNORMAL
DATE ANALYZED: ABNORMAL
DATE OF COLLECTION: ABNORMAL
FERRITIN: 5839 NG/ML
GFR AFRICAN AMERICAN: >60
GFR NON-AFRICAN AMERICAN: 56 ML/MIN/1.73
GLUCOSE BLD-MCNC: 303 MG/DL (ref 74–99)
HCO3: 19.8 MMOL/L (ref 22–26)
HHB: 4.9 % (ref 0–5)
IRON SATURATION: 103 % (ref 20–55)
IRON: 160 MCG/DL (ref 59–158)
LAB: ABNORMAL
Lab: ABNORMAL
MAGNESIUM: 1.4 MG/DL (ref 1.6–2.6)
METER GLUCOSE: 329 MG/DL (ref 74–99)
METER GLUCOSE: 350 MG/DL (ref 74–99)
METER GLUCOSE: 353 MG/DL (ref 74–99)
METER GLUCOSE: 356 MG/DL (ref 74–99)
METER GLUCOSE: 363 MG/DL (ref 74–99)
METER GLUCOSE: 365 MG/DL (ref 74–99)
METER GLUCOSE: 403 MG/DL (ref 74–99)
METER GLUCOSE: 482 MG/DL (ref 74–99)
METER GLUCOSE: >500 MG/DL (ref 74–99)
METHB: 0.5 % (ref 0–1.5)
MODE: ABNORMAL
O2 CONTENT: 9.6 ML/DL
O2 SATURATION: 95 % (ref 92–98.5)
O2HB: 93.2 % (ref 94–97)
OPERATOR ID: 2860
OSMOLALITY: 322 MOSM/KG (ref 285–310)
PATIENT TEMP: 37 C
PCO2: 36.7 MMHG (ref 35–45)
PH BLOOD GAS: 7.35 (ref 7.35–7.45)
PHOSPHORUS: 3.3 MG/DL (ref 2.5–4.5)
PO2: 79 MMHG (ref 75–100)
POTASSIUM SERPL-SCNC: 3.9 MMOL/L (ref 3.5–5)
SODIUM BLD-SCNC: 134 MMOL/L (ref 132–146)
SOURCE, BLOOD GAS: ABNORMAL
THB: 7.2 G/DL (ref 11.5–16.5)
TIME ANALYZED: 15
TOTAL IRON BINDING CAPACITY: 155 MCG/DL (ref 250–450)
TOTAL PROTEIN: 6.5 G/DL (ref 6.4–8.3)
URINE CULTURE, ROUTINE: NORMAL

## 2020-08-02 PROCEDURE — 82805 BLOOD GASES W/O2 SATURATION: CPT

## 2020-08-02 PROCEDURE — 80048 BASIC METABOLIC PNL TOTAL CA: CPT

## 2020-08-02 PROCEDURE — 2580000003 HC RX 258: Performed by: RADIOLOGY

## 2020-08-02 PROCEDURE — 6360000004 HC RX CONTRAST MEDICATION: Performed by: RADIOLOGY

## 2020-08-02 PROCEDURE — 36415 COLL VENOUS BLD VENIPUNCTURE: CPT

## 2020-08-02 PROCEDURE — 6360000002 HC RX W HCPCS: Performed by: INTERNAL MEDICINE

## 2020-08-02 PROCEDURE — 6370000000 HC RX 637 (ALT 250 FOR IP): Performed by: INTERNAL MEDICINE

## 2020-08-02 PROCEDURE — 74177 CT ABD & PELVIS W/CONTRAST: CPT

## 2020-08-02 PROCEDURE — 99231 SBSQ HOSP IP/OBS SF/LOW 25: CPT | Performed by: INTERNAL MEDICINE

## 2020-08-02 PROCEDURE — 83735 ASSAY OF MAGNESIUM: CPT

## 2020-08-02 PROCEDURE — 6370000000 HC RX 637 (ALT 250 FOR IP): Performed by: STUDENT IN AN ORGANIZED HEALTH CARE EDUCATION/TRAINING PROGRAM

## 2020-08-02 PROCEDURE — 2580000003 HC RX 258: Performed by: INTERNAL MEDICINE

## 2020-08-02 PROCEDURE — 2060000000 HC ICU INTERMEDIATE R&B

## 2020-08-02 PROCEDURE — 80076 HEPATIC FUNCTION PANEL: CPT

## 2020-08-02 PROCEDURE — 82962 GLUCOSE BLOOD TEST: CPT

## 2020-08-02 PROCEDURE — 84100 ASSAY OF PHOSPHORUS: CPT

## 2020-08-02 RX ORDER — SODIUM CHLORIDE 9 MG/ML
INJECTION, SOLUTION INTRAVENOUS CONTINUOUS
Status: DISCONTINUED | OUTPATIENT
Start: 2020-08-02 | End: 2020-08-02

## 2020-08-02 RX ORDER — DEXTROSE MONOHYDRATE 25 G/50ML
12.5 INJECTION, SOLUTION INTRAVENOUS PRN
Status: DISCONTINUED | OUTPATIENT
Start: 2020-08-01 | End: 2020-08-07 | Stop reason: HOSPADM

## 2020-08-02 RX ORDER — INSULIN GLARGINE 100 [IU]/ML
15 INJECTION, SOLUTION SUBCUTANEOUS NIGHTLY
Status: DISCONTINUED | OUTPATIENT
Start: 2020-08-02 | End: 2020-08-07 | Stop reason: HOSPADM

## 2020-08-02 RX ORDER — MAGNESIUM SULFATE IN WATER 40 MG/ML
2 INJECTION, SOLUTION INTRAVENOUS ONCE
Status: COMPLETED | OUTPATIENT
Start: 2020-08-02 | End: 2020-08-02

## 2020-08-02 RX ORDER — SODIUM CHLORIDE 0.9 % (FLUSH) 0.9 %
10 SYRINGE (ML) INJECTION PRN
Status: DISCONTINUED | OUTPATIENT
Start: 2020-08-02 | End: 2020-08-07 | Stop reason: HOSPADM

## 2020-08-02 RX ORDER — DEXTROSE MONOHYDRATE 50 MG/ML
100 INJECTION, SOLUTION INTRAVENOUS PRN
Status: DISCONTINUED | OUTPATIENT
Start: 2020-08-01 | End: 2020-08-07 | Stop reason: HOSPADM

## 2020-08-02 RX ORDER — NICOTINE POLACRILEX 4 MG
15 LOZENGE BUCCAL PRN
Status: DISCONTINUED | OUTPATIENT
Start: 2020-08-01 | End: 2020-08-07 | Stop reason: HOSPADM

## 2020-08-02 RX ORDER — INSULIN GLARGINE 100 [IU]/ML
10 INJECTION, SOLUTION SUBCUTANEOUS NIGHTLY
Status: DISCONTINUED | OUTPATIENT
Start: 2020-08-02 | End: 2020-08-02

## 2020-08-02 RX ADMIN — CIPROFLOXACIN 400 MG: 2 INJECTION, SOLUTION INTRAVENOUS at 09:18

## 2020-08-02 RX ADMIN — SODIUM CHLORIDE: 9 INJECTION, SOLUTION INTRAVENOUS at 00:29

## 2020-08-02 RX ADMIN — INSULIN HUMAN 5 UNITS: 100 INJECTION, SOLUTION PARENTERAL at 19:23

## 2020-08-02 RX ADMIN — IOPAMIDOL 110 ML: 755 INJECTION, SOLUTION INTRAVENOUS at 18:06

## 2020-08-02 RX ADMIN — INSULIN LISPRO 12 UNITS: 100 INJECTION, SOLUTION INTRAVENOUS; SUBCUTANEOUS at 19:23

## 2020-08-02 RX ADMIN — ENOXAPARIN SODIUM 40 MG: 40 INJECTION SUBCUTANEOUS at 09:19

## 2020-08-02 RX ADMIN — INSULIN GLARGINE 15 UNITS: 100 INJECTION, SOLUTION SUBCUTANEOUS at 20:56

## 2020-08-02 RX ADMIN — LACTULOSE 20 G: 20 SOLUTION ORAL at 09:19

## 2020-08-02 RX ADMIN — RIFAXIMIN 200 MG: 200 TABLET ORAL at 14:09

## 2020-08-02 RX ADMIN — INSULIN LISPRO 10 UNITS: 100 INJECTION, SOLUTION INTRAVENOUS; SUBCUTANEOUS at 09:35

## 2020-08-02 RX ADMIN — RIFAXIMIN 200 MG: 200 TABLET ORAL at 20:51

## 2020-08-02 RX ADMIN — INSULIN LISPRO 10 UNITS: 100 INJECTION, SOLUTION INTRAVENOUS; SUBCUTANEOUS at 12:14

## 2020-08-02 RX ADMIN — INSULIN LISPRO 8 UNITS: 100 INJECTION, SOLUTION INTRAVENOUS; SUBCUTANEOUS at 06:40

## 2020-08-02 RX ADMIN — MAGNESIUM SULFATE HEPTAHYDRATE 2 G: 40 INJECTION, SOLUTION INTRAVENOUS at 06:40

## 2020-08-02 RX ADMIN — INSULIN LISPRO 20 UNITS: 100 INJECTION, SOLUTION INTRAVENOUS; SUBCUTANEOUS at 00:27

## 2020-08-02 RX ADMIN — SODIUM CHLORIDE, PRESERVATIVE FREE 10 ML: 5 INJECTION INTRAVENOUS at 09:24

## 2020-08-02 RX ADMIN — Medication 10 ML: at 18:06

## 2020-08-02 RX ADMIN — RIFAXIMIN 200 MG: 200 TABLET ORAL at 09:18

## 2020-08-02 RX ADMIN — LACTULOSE 20 G: 20 SOLUTION ORAL at 14:09

## 2020-08-02 RX ADMIN — LACTULOSE 20 G: 20 SOLUTION ORAL at 20:51

## 2020-08-02 RX ADMIN — CIPROFLOXACIN 400 MG: 2 INJECTION, SOLUTION INTRAVENOUS at 20:50

## 2020-08-02 RX ADMIN — INSULIN HUMAN 5 UNITS: 100 INJECTION, SOLUTION PARENTERAL at 21:04

## 2020-08-02 RX ADMIN — SODIUM CHLORIDE, PRESERVATIVE FREE 10 ML: 5 INJECTION INTRAVENOUS at 20:51

## 2020-08-02 ASSESSMENT — PAIN SCALES - GENERAL: PAINLEVEL_OUTOF10: 0

## 2020-08-02 NOTE — PROGRESS NOTES
[facility]  Internal Medicine Residency Program  Progress Note - House Team 2    Patient:  Joseluis Ramirez 62 y.o. male MRN: [id number]     Date of Service: 8/2/2020     CC: Fall, altered mental status  Overnight events: None  Hospital Day: 3    Subjective     Patient was awake and alert this morning. Was slow to answer questions but more interactive compared to yesterday. Patient had no specific complaints. Objective     Physical Exam:  Vitals: /62   Pulse 111   Temp 98.1 °F (36.7 °C) (Temporal)   Resp 16   Ht 6' 2\" (1.88 m)   Wt 151 lb (68.5 kg)   SpO2 100%   BMI 19.39 kg/m²     I & O - 24hr:     Intake/Output Summary (Last 24 hours) at 8/2/2020 0650  Last data filed at 8/2/2020 0636  Gross per 24 hour   Intake 730 ml   Output 725 ml   Net 5 ml     · General Appearance: Frail-appearing. Awake and alert. Oriented to person, place, and time. Was able to answer questions and follow commands. · HEENT:  Head: Normal, normocephalic, atraumatic. · Neck: no JVD, supple, symmetrical, trachea midline and thyroid not enlarged, symmetric, no tenderness/mass/nodules  · Lung: clear to auscultation bilaterally  · Heart: regular rate and rhythm, S1, S2 normal, no murmur, click, rub or gallop  · Abdomen: soft, non-tender; bowel sounds normal; no masses,  no organomegaly  · Extremities:  extremities normal, atraumatic, no cyanosis or edema  · Neurologic: Alert and oriented to person, place, and time. Answering questions and following commands. No asterixis with arms and wrists extended.     Pertinent Labs & Imaging Studies     CBC:   Lab Results   Component Value Date    WBC 5.4 07/31/2020    RBC 2.61 07/31/2020    HGB 7.9 07/31/2020    HCT 23.9 07/31/2020    MCV 91.6 07/31/2020    MCH 30.3 07/31/2020    MCHC 33.1 07/31/2020    RDW 23.3 07/31/2020    PLT 84 07/31/2020    MPV 11.7 07/31/2020     BMP:    Lab Results   Component Value Date     08/02/2020    K 3.9 08/02/2020    K 5.2 07/31/2020  08/02/2020    CO2 20 08/02/2020    BUN 33 08/02/2020    LABALBU 2.6 08/02/2020    LABALBU 2.4 01/07/2020    CREATININE 1.3 08/02/2020    CALCIUM 9.5 08/02/2020    GFRAA >60 08/02/2020    LABGLOM 56 08/02/2020    LABGLOM 24 01/07/2020    GLUCOSE 303 08/02/2020    GLUCOSE 216 01/07/2020     Hepatic Function Panel:    Lab Results   Component Value Date    ALKPHOS 161 08/02/2020    ALT 48 08/02/2020    AST 54 08/02/2020    PROT 6.5 08/02/2020    BILITOT 5.1 08/02/2020    BILIDIR 1.2 08/02/2020    IBILI 3.9 08/02/2020    LABALBU 2.6 08/02/2020    LABALBU 2.4 01/07/2020     Albumin:    Lab Results   Component Value Date    LABALBU 2.6 08/02/2020    LABALBU 2.4 01/07/2020     Magnesium:    Lab Results   Component Value Date    MG 1.4 08/02/2020     PT/INR:    Lab Results   Component Value Date    PROTIME 19.1 07/31/2020    INR 1.7 07/31/2020     Last 3 Troponin:    Lab Results   Component Value Date    TROPONINI 0.02 07/31/2020     U/A:    Lab Results   Component Value Date    COLORU Yellow 07/31/2020    PROTEINU Negative 07/31/2020    PHUR 6.0 07/31/2020    WBCUA NONE 07/31/2020    RBCUA 1-3 07/31/2020    BACTERIA RARE 07/31/2020    CLARITYU Clear 07/31/2020    SPECGRAV 1.015 07/31/2020    LEUKOCYTESUR Negative 07/31/2020    UROBILINOGEN 0.2 07/31/2020    BILIRUBINUR Negative 07/31/2020    BLOODU TRACE-INTACT 07/31/2020    GLUCOSEU Negative 07/31/2020     Resident's Assessment and Plan     Piotr Gilmore is a 62 y.o. male with a PMHx of cirrhosis, EtOH abuse, TIA, CKD , HTN, HLD, DM and anemia who presents with a fall and AMS. Per the wife, he has normal mental status at baseline. She last spoke with him over the phone 1 day PTA. Was unresponsive and not following commands on presentation. Ammonia was found to be elevated. He was started on Lactulose and Rifaximin with improvement of his neurological status. Abdominal US revealed a moderate amount of free fluid and a complex mass posterior to the spleen.  CT A/P pending. 1. Acute encephalopathy, improved  2/2 hepatic encephalopathy vs infection (SBP) vs trauma vs CVA vs substance intoxication/withdrawal. Likely hepatic encephalopathy in the setting of cirrhosis, elevated ammonia, and positive asterixis. Has clinical improvement with Lactulose administration. Abd US showed moderate amount of free fluid in the abdomen and a complex cystic/solid mass posterior to the spleen. Less likely SBP no fever or elevated WBC. Less likely CVA with no acute findings on CTH and no focal deficits. Less likely due to substance with negative urine and serum EtOH. Blood cultures no growth 24 hr x2. Ammonia normalized 117->46. · Lactulose PO  · Rifaximin 200 mg TID  · Cipro 400 mg IV Q12H for SBP prophylaxis  · CT A/P with contrast to assess complex mass posterior to spleen  · Monitor LFT's     2. LAURO on CKD  Cr 1.2 in January 2020. Cr 1.4 today. Unclear what stage of CKD, GFR >60 in January. Received 1 L NS bolus in the ED. Cr stable at 1.3. Will receive contrast for CT A/P.  · Daily BMP  · Monitor UOP     3. Fall  Fall from bed. CTH and CT C-spine showed no acute abnormality. X-rays showed no acute injury. Less likely to be intracranial hemorrhage.     4. Hyperkalemia, resolved  Potassium 5.2. Likely 2/2 LAURO. Improved to 3.9.  · Daily BMP     5. Hypercalcemia  Calcium 10.5 on presentation. Ionized calcium elevated to 1.39. Today, corrected calcium 10.6. Possibly 2/2 hyperparathyroidism vs malignancy. · Consider CT A/P     6. Chronic normocytic anemia  Wife reports that recent EGD, colonoscopy, and pill endoscopy did not identify bleeding source. Bone marrow biopsy 7/13/2020: . Retics high normal 1.8%. Low haptoglobin. Possible hemolysis 2/2 cirrhosis. ·  Peripheral blood smear     7. Hx of cirrhosis  2/2 EtOH. Elevated ammonia. Abd US with moderate amount of free fluid. No fever or elevated WBC. · Antibiotics as above     8. Hx of DM  Glucose 165.  Per med hx on Lantus 15u QHS and 30u QAM and Glipizide 2.5 mg QAM.     9. Hx of HTN  /54. On Lopressor 25 mg BID at home.  Hold home meds for now.     PT/OT evaluation: Pending  DVT prophylaxis/ GI prophylaxis: Lovenox  Disposition: Continue current care    William Martinez MD,  PGY-1  Attending Physician: Dr. Ira Butterfield

## 2020-08-02 NOTE — CONSULTS
[address]                  [address] [facility]  [facility]                                  CONSULTATION    PATIENT NAME: Michele Capellan                      :        1960  MED REC NO:   [id number]                            ROOM:       [id number]  ACCOUNT NO:   [de-identified]                           ADMIT DATE: 2020  PROVIDER:     Jessica Braswell MD    CONSULT DATE:  2020    REASON FOR CONSULTATION:  Cirrhosis, alcohol, markedly abnormal iron  studies. HISTORY:  This is a 67-year-old male. He was apparently transferred  from [facility] at a rehab center following a fall though he denies. Wife  is at the bedside. He does have a history of heavy alcohol consumption  and had been recently hospitalized at [facility]. He has had a  recent bone marrow completed. He was in the rehab center in [facility]  for approximately a week. He does have a history of periodic confusion. He was in the emergency room and transferred from [facility] in  2020. He had evidence of advanced liver disease at that time and a  small subdural hematoma and he was apparently accepted for transfer to  [facility] at that time where his wife said he spent three  weeks. What it transpired at that point is not exactly clear, but she  does say he underwent an endoscopic evaluation of the upper GI tract. He had liver and renal issues at that time. He does have frequent  transfusion requirements. She suggests that he has had 50 transfusions  since 2020. He has been evaluated by Hematology/Oncology who  recently completed his bone marrow with results pending and he has had  an evaluation including [place]'s EGD with a colonoscopy and wireless  capsule study without documentation of bleeding. Haptoglobin was low at  [facility] earlier this year. He has had a low haptoglobin here.   His  ferritin is in excess of 5000 and his iron saturation is in excess of  100%. He is, at this point, alert. He has eaten his lunch. He would like his  Griffin catheter removed and he wants to go home. He was living at home  prior to his admission to [facility] and subsequently transferred  to the rehab center in [facility]. He lives with his wife. He has home  healthcare, but she works. I tried to compare baseline mental status  with current status and did not get very far, but he did suggest he  wants his Griffin catheter out and pointed to the urinal by the window and  suggested he could use that. Now, he did have an ultrasound on this admission confirming the presence  of ascites. It also suggested the presence of a complicated mass  posterior to the spleen. It measures 8 x 7 x 5 cm. Having been  extensively evaluated elsewhere at [facility] and [facility], it is hard to imagine what this might be or not previously  recognized. PAST MEDICAL HISTORY:  Dominated by his history of alcohol and  and cirrhosis. Severe anemia requiring multiple transfusions  without recognized bleeding despite an extensive evaluation. History of  a small subdural hematoma earlier this year which required no operative  intervention. Remote lumbar fusion. Hernia repair. Hypertension    MEDICATIONS:  He came to this hospital on furosemide, Glucotrol,  insulin, lactulose, pantoprazole, Carafate, folic acid, metoprolol. SOCIAL HISTORY:  He is . He is disabled. He has a history of  chronic alcoholism. He is a former smoker. He used smokeless tobacco.    ALLERGIES:  There are no known allergies. OBJECTIVE:  GENERAL:  Thin male with muscle wasting, but he is bright and alert. He  is so focused on going home. It is hard to elicit a significant mental  status exam, but as noted, he points out his current situation and how  he would like it corrected. He is pale, but not jaundiced. NECK:  Veins are flat. LUNGS:  Fields are clear.   HEART:  Tones normal.  ABDOMEN:  I do not feel a spleen tip. He has a small amount of ascites  and no edema. NEUROLOGIC:  I did not demonstrate asterixis. ASSESSMENT:  At this point, it seems hardly probable he has hereditary  hemochromatosis and I would not spend the money to check for it. He has a   longstanding history of heavy alcohol abuse and now, he is  multiply transfused. Now, why he is anemic, I do not know. Bleeding  has not been documented. Extensively evaluated. He does have evidence  of mild encephalopathy. Whatever it is in his abdomen and I suspect nothing will be  confirmed radiographically, it is probably a moot point at this time. I  did find his bone marrow which was not diagnostic. It said iron was  present. It did not comment on the qualitative amount. CAT scan is pending. Would proceed with discharge in the next day or  so, get him back to rehab because I do not think much can be  accomplished here on his behalf. He has a gastroenterologist and hematologist   at [facility] with whom to follow up.       Bridgette Slater MD    D: 08/02/2020 13:19:41       T: 08/02/2020 13:24:33     /[email or web address]  Job#: [id number]     Doc#: [id number]    CC:

## 2020-08-02 NOTE — PROGRESS NOTES
[address]   Internal Medicine Residency / [address]    Attending Physician Statement  I have discussed the case, including pertinent history and exam findings with the resident and the team.  I have seen and examined the patient and the key elements of the encounter have been performed by me. I agree with the assessment, plan and orders as documented by the resident. Much more alert this AM  And no asterixis and taking po  Some ascites  And hx of ETOH and question of Hemochromatosis with  High Fe studies and ongoing anemia  And Cirrhosis    R/O Varices and GI consult needed   Is able to walk with walker   And will need PT  Suspicious Mass behind spleen  And imagery with contrast to be attempted   Plan; Continue same            Meds reviewed  Remainder of medical problems as per resident note.       Denise Bear  Internal Medicine Residency Faculty

## 2020-08-03 LAB
ACANTHOCYTES: ABNORMAL
ALBUMIN SERPL-MCNC: 2.7 G/DL (ref 3.5–5.2)
ALP BLD-CCNC: 158 U/L (ref 40–129)
ALT SERPL-CCNC: 48 U/L (ref 0–40)
AMMONIA: 97 UMOL/L (ref 16–60)
ANION GAP SERPL CALCULATED.3IONS-SCNC: 10 MMOL/L (ref 7–16)
ANION GAP SERPL CALCULATED.3IONS-SCNC: 11 MMOL/L (ref 7–16)
ANISOCYTOSIS: ABNORMAL
APTT: 44 SEC (ref 24.5–35.1)
AST SERPL-CCNC: 55 U/L (ref 0–39)
BASOPHILS ABSOLUTE: 0.09 E9/L (ref 0–0.2)
BASOPHILS RELATIVE PERCENT: 1.7 % (ref 0–2)
BILIRUB SERPL-MCNC: 4.8 MG/DL (ref 0–1.2)
BILIRUBIN DIRECT: 1.4 MG/DL (ref 0–0.3)
BILIRUBIN, INDIRECT: 3.4 MG/DL (ref 0–1)
BUN BLDV-MCNC: 32 MG/DL (ref 6–20)
BUN BLDV-MCNC: 35 MG/DL (ref 6–20)
CALCIUM SERPL-MCNC: 9.5 MG/DL (ref 8.6–10.2)
CALCIUM SERPL-MCNC: 9.8 MG/DL (ref 8.6–10.2)
CHLORIDE BLD-SCNC: 102 MMOL/L (ref 98–107)
CHLORIDE BLD-SCNC: 99 MMOL/L (ref 98–107)
CO2: 21 MMOL/L (ref 22–29)
CO2: 21 MMOL/L (ref 22–29)
CREAT SERPL-MCNC: 1.8 MG/DL (ref 0.7–1.2)
CREAT SERPL-MCNC: 1.8 MG/DL (ref 0.7–1.2)
D DIMER: 600 NG/ML DDU
EOSINOPHILS ABSOLUTE: 0.22 E9/L (ref 0.05–0.5)
EOSINOPHILS RELATIVE PERCENT: 4.3 % (ref 0–6)
FIBRINOGEN: 205 MG/DL (ref 225–540)
GFR AFRICAN AMERICAN: 47
GFR AFRICAN AMERICAN: 47
GFR NON-AFRICAN AMERICAN: 39 ML/MIN/1.73
GFR NON-AFRICAN AMERICAN: 39 ML/MIN/1.73
GLUCOSE BLD-MCNC: 217 MG/DL (ref 74–99)
GLUCOSE BLD-MCNC: 250 MG/DL (ref 74–99)
HCT VFR BLD CALC: 17.2 % (ref 37–54)
HEMOGLOBIN: 5.6 G/DL (ref 12.5–16.5)
INR BLD: 1.8
LYMPHOCYTES ABSOLUTE: 1.3 E9/L (ref 1.5–4)
LYMPHOCYTES RELATIVE PERCENT: 25.2 % (ref 20–42)
MAGNESIUM: 1.6 MG/DL (ref 1.6–2.6)
MCH RBC QN AUTO: 30.9 PG (ref 26–35)
MCHC RBC AUTO-ENTMCNC: 32.6 % (ref 32–34.5)
MCV RBC AUTO: 95 FL (ref 80–99.9)
METAMYELOCYTES RELATIVE PERCENT: 0.9 % (ref 0–1)
METER GLUCOSE: 211 MG/DL (ref 74–99)
METER GLUCOSE: 244 MG/DL (ref 74–99)
METER GLUCOSE: 299 MG/DL (ref 74–99)
METER GLUCOSE: 323 MG/DL (ref 74–99)
METER GLUCOSE: 331 MG/DL (ref 74–99)
MONOCYTES ABSOLUTE: 0.47 E9/L (ref 0.1–0.95)
MONOCYTES RELATIVE PERCENT: 8.7 % (ref 2–12)
NEUTROPHILS ABSOLUTE: 3.12 E9/L (ref 1.8–7.3)
NEUTROPHILS RELATIVE PERCENT: 59.1 % (ref 43–80)
NUCLEATED RED BLOOD CELLS: 0.9 /100 WBC
OVALOCYTES: ABNORMAL
PAPPENHEIMER BODIES: ABNORMAL
PATHOLOGIST REVIEW: NORMAL
PDW BLD-RTO: 23.6 FL (ref 11.5–15)
PHOSPHORUS: 4.1 MG/DL (ref 2.5–4.5)
PLATELET # BLD: 66 E9/L (ref 130–450)
PLATELET CONFIRMATION: NORMAL
PMV BLD AUTO: 11.8 FL (ref 7–12)
POIKILOCYTES: ABNORMAL
POLYCHROMASIA: ABNORMAL
POTASSIUM SERPL-SCNC: 4.8 MMOL/L (ref 3.5–5)
POTASSIUM SERPL-SCNC: 5.4 MMOL/L (ref 3.5–5)
PROTHROMBIN TIME: 20.8 SEC (ref 9.3–12.4)
RBC # BLD: 1.81 E12/L (ref 3.8–5.8)
SCHISTOCYTES: ABNORMAL
SODIUM BLD-SCNC: 131 MMOL/L (ref 132–146)
SODIUM BLD-SCNC: 133 MMOL/L (ref 132–146)
TARGET CELLS: ABNORMAL
TOTAL PROTEIN: 6.2 G/DL (ref 6.4–8.3)
TOXIC GRANULATION: ABNORMAL
WBC # BLD: 5.2 E9/L (ref 4.5–11.5)

## 2020-08-03 PROCEDURE — 85025 COMPLETE CBC W/AUTO DIFF WBC: CPT

## 2020-08-03 PROCEDURE — 85378 FIBRIN DEGRADE SEMIQUANT: CPT

## 2020-08-03 PROCEDURE — 85730 THROMBOPLASTIN TIME PARTIAL: CPT

## 2020-08-03 PROCEDURE — 6360000002 HC RX W HCPCS: Performed by: INTERNAL MEDICINE

## 2020-08-03 PROCEDURE — 97530 THERAPEUTIC ACTIVITIES: CPT

## 2020-08-03 PROCEDURE — 2580000003 HC RX 258: Performed by: RADIOLOGY

## 2020-08-03 PROCEDURE — 2060000000 HC ICU INTERMEDIATE R&B

## 2020-08-03 PROCEDURE — 6370000000 HC RX 637 (ALT 250 FOR IP): Performed by: STUDENT IN AN ORGANIZED HEALTH CARE EDUCATION/TRAINING PROGRAM

## 2020-08-03 PROCEDURE — 2580000003 HC RX 258: Performed by: STUDENT IN AN ORGANIZED HEALTH CARE EDUCATION/TRAINING PROGRAM

## 2020-08-03 PROCEDURE — 99233 SBSQ HOSP IP/OBS HIGH 50: CPT | Performed by: INTERNAL MEDICINE

## 2020-08-03 PROCEDURE — 85014 HEMATOCRIT: CPT

## 2020-08-03 PROCEDURE — 97161 PT EVAL LOW COMPLEX 20 MIN: CPT

## 2020-08-03 PROCEDURE — 85384 FIBRINOGEN ACTIVITY: CPT

## 2020-08-03 PROCEDURE — 6370000000 HC RX 637 (ALT 250 FOR IP): Performed by: INTERNAL MEDICINE

## 2020-08-03 PROCEDURE — 82962 GLUCOSE BLOOD TEST: CPT

## 2020-08-03 PROCEDURE — 80048 BASIC METABOLIC PNL TOTAL CA: CPT

## 2020-08-03 PROCEDURE — 36415 COLL VENOUS BLD VENIPUNCTURE: CPT

## 2020-08-03 PROCEDURE — 85018 HEMOGLOBIN: CPT

## 2020-08-03 PROCEDURE — 85610 PROTHROMBIN TIME: CPT

## 2020-08-03 PROCEDURE — 80076 HEPATIC FUNCTION PANEL: CPT

## 2020-08-03 PROCEDURE — 84100 ASSAY OF PHOSPHORUS: CPT

## 2020-08-03 PROCEDURE — 82140 ASSAY OF AMMONIA: CPT

## 2020-08-03 PROCEDURE — 84238 ASSAY NONENDOCRINE RECEPTOR: CPT

## 2020-08-03 PROCEDURE — 2580000003 HC RX 258: Performed by: INTERNAL MEDICINE

## 2020-08-03 PROCEDURE — 83735 ASSAY OF MAGNESIUM: CPT

## 2020-08-03 RX ORDER — INSULIN GLARGINE 100 [IU]/ML
20 INJECTION, SOLUTION SUBCUTANEOUS
Status: DISCONTINUED | OUTPATIENT
Start: 2020-08-03 | End: 2020-08-07

## 2020-08-03 RX ORDER — MAGNESIUM SULFATE IN WATER 40 MG/ML
2 INJECTION, SOLUTION INTRAVENOUS ONCE
Status: COMPLETED | OUTPATIENT
Start: 2020-08-03 | End: 2020-08-03

## 2020-08-03 RX ORDER — 0.9 % SODIUM CHLORIDE 0.9 %
1000 INTRAVENOUS SOLUTION INTRAVENOUS ONCE
Status: COMPLETED | OUTPATIENT
Start: 2020-08-03 | End: 2020-08-04

## 2020-08-03 RX ADMIN — Medication 10 ML: at 10:24

## 2020-08-03 RX ADMIN — SODIUM CHLORIDE, PRESERVATIVE FREE 10 ML: 5 INJECTION INTRAVENOUS at 08:52

## 2020-08-03 RX ADMIN — INSULIN LISPRO 12 UNITS: 100 INJECTION, SOLUTION INTRAVENOUS; SUBCUTANEOUS at 11:52

## 2020-08-03 RX ADMIN — INSULIN GLARGINE 15 UNITS: 100 INJECTION, SOLUTION SUBCUTANEOUS at 22:02

## 2020-08-03 RX ADMIN — LACTULOSE 20 G: 20 SOLUTION ORAL at 14:19

## 2020-08-03 RX ADMIN — INSULIN LISPRO 6 UNITS: 100 INJECTION, SOLUTION INTRAVENOUS; SUBCUTANEOUS at 22:01

## 2020-08-03 RX ADMIN — ENOXAPARIN SODIUM 40 MG: 40 INJECTION SUBCUTANEOUS at 08:52

## 2020-08-03 RX ADMIN — LACTULOSE 20 G: 20 SOLUTION ORAL at 08:52

## 2020-08-03 RX ADMIN — RIFAXIMIN 200 MG: 200 TABLET ORAL at 22:04

## 2020-08-03 RX ADMIN — INSULIN GLARGINE 20 UNITS: 100 INJECTION, SOLUTION SUBCUTANEOUS at 08:52

## 2020-08-03 RX ADMIN — INSULIN LISPRO 12 UNITS: 100 INJECTION, SOLUTION INTRAVENOUS; SUBCUTANEOUS at 17:21

## 2020-08-03 RX ADMIN — CIPROFLOXACIN 400 MG: 2 INJECTION, SOLUTION INTRAVENOUS at 08:52

## 2020-08-03 RX ADMIN — MAGNESIUM SULFATE HEPTAHYDRATE 2 G: 40 INJECTION, SOLUTION INTRAVENOUS at 10:24

## 2020-08-03 RX ADMIN — CIPROFLOXACIN 400 MG: 2 INJECTION, SOLUTION INTRAVENOUS at 22:04

## 2020-08-03 RX ADMIN — LACTULOSE 20 G: 20 SOLUTION ORAL at 22:03

## 2020-08-03 RX ADMIN — SODIUM CHLORIDE 1000 ML: 9 INJECTION, SOLUTION INTRAVENOUS at 17:21

## 2020-08-03 RX ADMIN — INSULIN LISPRO 9 UNITS: 100 INJECTION, SOLUTION INTRAVENOUS; SUBCUTANEOUS at 08:53

## 2020-08-03 RX ADMIN — RIFAXIMIN 200 MG: 200 TABLET ORAL at 14:19

## 2020-08-03 RX ADMIN — SODIUM CHLORIDE, PRESERVATIVE FREE 10 ML: 5 INJECTION INTRAVENOUS at 22:04

## 2020-08-03 RX ADMIN — RIFAXIMIN 200 MG: 200 TABLET ORAL at 08:52

## 2020-08-03 ASSESSMENT — PAIN SCALES - GENERAL
PAINLEVEL_OUTOF10: 0

## 2020-08-03 NOTE — PROGRESS NOTES
[facility]  Internal Medicine Residency / [address]    Attending Physician Statement  I have discussed the case, including pertinent history and exam findings with the resident and the team.  I have seen and examined the patient, reviewed meds and pertinent labs and the key elements of the encounter have been performed by me. I agree with the assessment, plan and orders as documented by the resident. Patient is awake and oriented this am, no asterixis is noted on exam. GI input noted. Creatinine is somewhat increased today, patient has no significant lower extremity edema and abdomen is soft and non-tender and not distended. Peripheral smear noted, has schistocytes, I do not see repeat CBC for today, med team to address. Consider checking PTT, PT, fibrinogen, D-dimer, and hematology assessment. Remainder of medical problems as per resident note.       Jonathan Tipton  Internal Medicine Residency Faculty

## 2020-08-03 NOTE — PROGRESS NOTES
Physical Therapy  Physical Therapy Initial Assessment     Name: Terra Garcia  : 1960  MRN: [id number]    Referring Provider:  Aracelis Gasca MD     Date of Service: 8/3/2020    Evaluating PT:  Mook Thorne, PT, DPT. [id number]    Room #:  9324/1540-C  Diagnosis:  Hepatic encephalopathy   Reason for admission:  Fall out of bed, AMS   Precautions:  Falls, L foot drop, TSM  Pertinent PMHx: HTN, HLD, DM  Procedures: none  Equipment Recommendations:  FWW    SUBJECTIVE:  Pt admitted from SNF in [facility]. States ambulating with use of [employer] or using wheelchair for longer distances. OBJECTIVE:   Initial Evaluation  Date: 8/3 Treatment   Short Term/ Long Term   Goals   AM-PAC 6 Clicks      Was pt agreeable to Eval/treatment? Yes      Does pt have pain? Denies pain     Bed Mobility  Rolling: NT  Supine to sit: SBA  Sit to supine: SBA  Scooting: SBA  Independent    Transfers Sit to stand: SBA  Stand to sit: SBA  Stand pivot: NT  Independent    Ambulation    75 feet x2 with [employer] Janette    >200 feet with [employer] Mod I   Stair negotiation: ascended and descended  NT  TBD   ROM BUE:  See OT eval   BLE:  WFL     Strength BUE:  See OT eval   BLE:  knee ext 5/5  Ankle DF 5/5  Increase by 1/3 MMT grade    Balance Sitting EOB:  SBA  Dynamic Standing:  SBA [employer]  Sitting EOB:  indep  Dynamic Standing: Mod I [employer]     -Pt is A & O x 3  -Sensation:  unremarkable   -Edema:  unremarkable     Therapeutic Exercises:  functional activity     Patient education  Pt educated on safety, sequencing of transfers, and role of PT    Patient response to education:   Pt verbalized understanding Pt demonstrated skill Pt requires further education in this area   Yes  Yes  Reinforce      ASSESSMENT:    Comments:  Pt received supine in bed and agreeable to PT session  Pt alert and oriented on my exam. Seemed like a valid historian.  Able to get out of bed and stand without assist from therapist. Pt ambulated in rodriguez with use of walker - pt has narrow NORY and [id number]  [] Gait training 89298 -- minutes  [] Manual therapy [id number] -- minutes  [x] Therapeutic activities 95367 10 minutes  [] Therapeutic exercises 40651 -- minutes  [] Neuromuscular reeducation 74285 -- minutes     Kenji Palacios, PT, DPT  [id number]

## 2020-08-03 NOTE — PROGRESS NOTES
[facility]  Internal Medicine Residency Program  Progress Note - House Team 2    Patient:  Olya Oscar 62 y.o. male MRN: [id number]     Date of Service: 8/3/2020     CC: Fall, altered mental status  Overnight events: None  Hospital Day: 4    Subjective     Patient was awake and alert this morning. He says he feels well with no pain and no specific complaints. Denies HA, CP, SOB, abd pain, and n/v.    He reports that his last drink was in January 2020. Objective     Physical Exam:  Vitals: BP (!) 95/51   Pulse 92   Temp 98.1 °F (36.7 °C) (Temporal)   Resp 18   Ht 6' 2\" (1.88 m)   Wt 151 lb (68.5 kg)   SpO2 100%   BMI 19.39 kg/m²     I & O - 24hr:     Intake/Output Summary (Last 24 hours) at 8/3/2020 1618  Last data filed at 8/3/2020 1426  Gross per 24 hour   Intake 1330 ml   Output 600 ml   Net 730 ml     · General Appearance: Frail-appearing. Awake and alert. Oriented to person, place, and time. Was able to answer questions and follow commands. · HEENT:  Head: Normal, normocephalic, atraumatic. Recent epistaxis from both nares. · Neck: no JVD, supple, symmetrical, trachea midline and thyroid not enlarged, symmetric, no tenderness/mass/nodules  · Lung: clear to auscultation bilaterally  · Heart: regular rate and rhythm, 2/6 systolic ejection murmur LUSB>RUSB, S1, S2 normal, no click, rub or gallop  · Abdomen: soft, non-tender; bowel sounds normal; no masses,  no organomegaly  · Extremities:  extremities normal, atraumatic, no cyanosis or edema. Left foot drop noted, at baseline. · Neurologic: Alert and oriented to person, place, and time. Answering questions and following commands. No asterixis with arms and wrists extended.     Pertinent Labs & Imaging Studies     CBC:   Lab Results   Component Value Date    WBC 5.4 07/31/2020    RBC 2.61 07/31/2020    HGB 7.9 07/31/2020    HCT 23.9 07/31/2020    MCV 91.6 07/31/2020    MCH 30.3 07/31/2020    MCHC 33.1 07/31/2020    RDW 23.3 07/31/2020    PLT 84 07/31/2020    MPV 11.7 07/31/2020     BMP:    Lab Results   Component Value Date     08/03/2020    K 5.4 08/03/2020    K 5.2 07/31/2020     08/03/2020    CO2 21 08/03/2020    BUN 32 08/03/2020    LABALBU 2.7 08/03/2020    LABALBU 2.4 01/07/2020    CREATININE 1.8 08/03/2020    CALCIUM 9.5 08/03/2020    GFRAA 47 08/03/2020    LABGLOM 39 08/03/2020    LABGLOM 24 01/07/2020    GLUCOSE 250 08/03/2020    GLUCOSE 216 01/07/2020     Hepatic Function Panel:    Lab Results   Component Value Date    ALKPHOS 158 08/03/2020    ALT 48 08/03/2020    AST 55 08/03/2020    PROT 6.2 08/03/2020    BILITOT 4.8 08/03/2020    BILIDIR 1.4 08/03/2020    IBILI 3.4 08/03/2020    LABALBU 2.7 08/03/2020    LABALBU 2.4 01/07/2020     Albumin:    Lab Results   Component Value Date    LABALBU 2.7 08/03/2020    LABALBU 2.4 01/07/2020     Magnesium:    Lab Results   Component Value Date    MG 1.6 08/03/2020     PT/INR:    Lab Results   Component Value Date    PROTIME 19.1 07/31/2020    INR 1.7 07/31/2020     Last 3 Troponin:    Lab Results   Component Value Date    TROPONINI 0.02 07/31/2020     U/A:    Lab Results   Component Value Date    COLORU Yellow 07/31/2020    PROTEINU Negative 07/31/2020    PHUR 6.0 07/31/2020    WBCUA NONE 07/31/2020    RBCUA 1-3 07/31/2020    BACTERIA RARE 07/31/2020    CLARITYU Clear 07/31/2020    SPECGRAV 1.015 07/31/2020    LEUKOCYTESUR Negative 07/31/2020    UROBILINOGEN 0.2 07/31/2020    BILIRUBINUR Negative 07/31/2020    BLOODU TRACE-INTACT 07/31/2020    GLUCOSEU Negative 07/31/2020 7/31/2020 09:21 8/1/2020 21:32 8/3/2020 09:10   Ammonia 117.0 (H) 46.0 97.0 (H)     Resident's Assessment and Plan     Aron Domenico is a 62 y.o. male with a PMHx of cirrhosis, EtOH abuse, TIA, CKD , HTN, HLD, DM and anemia who presents with a fall and AMS. Per the wife, he has normal mental status at baseline. She last spoke with him over the phone 1 day PTA.  Was unresponsive and not following commands on presentation. Ammonia was found to be elevated. He was started on Lactulose and Rifaximin with improvement of his neurological status. Abdominal US revealed a moderate amount of free fluid and a complex mass posterior to the spleen. CT A/P showed advanced liver cirrhosis and moderate ascites and no mass in the LUQ; US finding was the fundus of the stomach. 1. Acute encephalopathy, improved  2/2 hepatic encephalopathy vs infection (SBP) vs trauma vs CVA vs substance intoxication/withdrawal. Likely hepatic encephalopathy in the setting of cirrhosis, elevated ammonia, and positive asterixis. Has clinical improvement with Lactulose administration. Abd US showed moderate amount of free fluid in the abdomen. Less likely SBP no fever or elevated WBC. Less likely CVA with no acute findings on CTH and no focal deficits. Less likely due to substance with negative urine and serum EtOH. Blood cultures no growth 24 hr x2. Ammonia improved 117->46->97, mentation remains normal.  · Lactulose PO  · Rifaximin 200 mg TID  · Cipro 400 mg IV Q12H for SBP prophylaxis  · Monitor LFT's     2. LAURO on CKD  Cr 1.2 in January 2020. Unclear what stage of CKD, GFR >60 in January and >60 currently. Cr increased 1.3->1.8 today, likely due to IV contrast on 8/2. · 1 L  mL/hr  · Daily BMP  · Monitor UOP     3. Hyperkalemia  Potassium 5.4. Likely 2/2 contrast-induced LAURO. · 1 L  mL/hr  · Daily BMP    4. Fall  Fall from bed. CTH and CT C-spine showed no acute abnormality. X-rays showed no acute injury. Less likely to be intracranial hemorrhage. 5. Hypercalcemia  Calcium 10.5 on presentation. Ionized calcium elevated to 1.39. Today, corrected calcium 10.6. Possibly 2/2 hyperparathyroidism vs malignancy.     6. Chronic normocytic anemia  Wife reports that recent EGD, colonoscopy, and pill endoscopy did not identify bleeding source.  Bone marrow biopsy 7/13/2020: Normocellular marrow with trilineage hematopoiesis, no increased blasts. Retics high normal 1.8%. Low haptoglobin. Blood smear shows thrombocytopenia with schistocytes, possibly TTP, HUS, and DIC. Consider [id number] and D-dimer.     7. Elevated ferritin  Ferritin 1,996 in February 2020. Ferritin 5,839, iron 160, TIBC 155, and iron sat 103%. Hx of blood transfusions every few weeks since January. Most likely 2/2 multiple transfusions. No chelation therapy at this time. 8. Hx of cirrhosis  2/2 EtOH. Elevated ammonia. Abd US with moderate amount of free fluid. No fever or elevated WBC. · Antibiotics as above    9. Hx of DM  Glucose poorly controlled in the 300's. Per med hx on Lantus 15u QHS and 30u QAM and Glipizide 2.5 mg QAM. Now on Lantus 20u in AM and Lantus 15u QHS and HDSS Q4H.     10. Hx of HTN  BP 95/51. On Lopressor 25 mg BID at home. Hold home meds for now. PT/OT evaluation: Pending  DVT prophylaxis/ GI prophylaxis: Lovenox  Disposition: Continue current care. Awaiting pre-cert for return to [facility].     Leslee Arango MD,  PGY-1  Attending Physician: Dr. Ross Hollis

## 2020-08-04 LAB
ABO/RH: NORMAL
ACANTHOCYTES: ABNORMAL
ACANTHOCYTES: ABNORMAL
ALBUMIN SERPL-MCNC: 2.6 G/DL (ref 3.5–5.2)
ALP BLD-CCNC: 177 U/L (ref 40–129)
ALT SERPL-CCNC: 45 U/L (ref 0–40)
AMMONIA: 31 UMOL/L (ref 16–60)
ANGLE (CLOT STRENGTH): 68.7 DEGREE (ref 59–74)
ANION GAP SERPL CALCULATED.3IONS-SCNC: 12 MMOL/L (ref 7–16)
ANISOCYTOSIS: ABNORMAL
ANISOCYTOSIS: ABNORMAL
ANTIBODY SCREEN: NORMAL
AST SERPL-CCNC: 58 U/L (ref 0–39)
BASOPHILS ABSOLUTE: 0.04 E9/L (ref 0–0.2)
BASOPHILS ABSOLUTE: 0.05 E9/L (ref 0–0.2)
BASOPHILS RELATIVE PERCENT: 0.9 % (ref 0–2)
BASOPHILS RELATIVE PERCENT: 0.9 % (ref 0–2)
BILIRUB SERPL-MCNC: 3.8 MG/DL (ref 0–1.2)
BILIRUBIN DIRECT: 1.3 MG/DL (ref 0–0.3)
BILIRUBIN, INDIRECT: 2.5 MG/DL (ref 0–1)
BLOOD BANK DISPENSE STATUS: NORMAL
BLOOD BANK PRODUCT CODE: NORMAL
BPU ID: NORMAL
BUN BLDV-MCNC: 33 MG/DL (ref 6–20)
BURR CELLS: ABNORMAL
CALCIUM SERPL-MCNC: 9.7 MG/DL (ref 8.6–10.2)
CHLORIDE BLD-SCNC: 99 MMOL/L (ref 98–107)
CO2: 20 MMOL/L (ref 22–29)
CREAT SERPL-MCNC: 1.6 MG/DL (ref 0.7–1.2)
DESCRIPTION BLOOD BANK: NORMAL
EOSINOPHILS ABSOLUTE: 0.15 E9/L (ref 0.05–0.5)
EOSINOPHILS ABSOLUTE: 0.18 E9/L (ref 0.05–0.5)
EOSINOPHILS RELATIVE PERCENT: 2.6 % (ref 0–6)
EOSINOPHILS RELATIVE PERCENT: 4 % (ref 0–6)
EPL-TEG: 4.7 % (ref 0–15)
G-TEG: 4 K D/SC (ref 4.5–11)
GFR AFRICAN AMERICAN: 54
GFR NON-AFRICAN AMERICAN: 44 ML/MIN/1.73
GLUCOSE BLD-MCNC: 230 MG/DL (ref 74–99)
HCT VFR BLD CALC: 16.8 % (ref 37–54)
HCT VFR BLD CALC: 22.2 % (ref 37–54)
HCT VFR BLD CALC: 22.5 % (ref 37–54)
HEMOGLOBIN: 5.6 G/DL (ref 12.5–16.5)
HEMOGLOBIN: 7.3 G/DL (ref 12.5–16.5)
HEMOGLOBIN: 7.3 G/DL (ref 12.5–16.5)
HYPOCHROMIA: ABNORMAL
HYPOCHROMIA: ABNORMAL
IMMATURE GRANULOCYTES #: 0.02 E9/L
IMMATURE GRANULOCYTES %: 0.4 % (ref 0–5)
K (CLOTTING TIME): 1.5 MIN (ref 1–3)
LACTATE DEHYDROGENASE: 173 U/L (ref 135–225)
LY30 (FIBRINOLYSIS): 4.7 % (ref 0–8)
LYMPHOCYTES ABSOLUTE: 0.95 E9/L (ref 1.5–4)
LYMPHOCYTES ABSOLUTE: 1.04 E9/L (ref 1.5–4)
LYMPHOCYTES RELATIVE PERCENT: 16.7 % (ref 20–42)
LYMPHOCYTES RELATIVE PERCENT: 23.1 % (ref 20–42)
MA (MAX AMPLITUDE): 44.6 MM (ref 50–70)
MAGNESIUM: 1.9 MG/DL (ref 1.6–2.6)
MCH RBC QN AUTO: 31.2 PG (ref 26–35)
MCH RBC QN AUTO: 31.6 PG (ref 26–35)
MCHC RBC AUTO-ENTMCNC: 32.4 % (ref 32–34.5)
MCHC RBC AUTO-ENTMCNC: 32.9 % (ref 32–34.5)
MCV RBC AUTO: 96.1 FL (ref 80–99.9)
MCV RBC AUTO: 96.2 FL (ref 80–99.9)
METER GLUCOSE: 221 MG/DL (ref 74–99)
METER GLUCOSE: 233 MG/DL (ref 74–99)
METER GLUCOSE: 236 MG/DL (ref 74–99)
METER GLUCOSE: 237 MG/DL (ref 74–99)
METER GLUCOSE: 257 MG/DL (ref 74–99)
MONOCYTES ABSOLUTE: 0.28 E9/L (ref 0.1–0.95)
MONOCYTES ABSOLUTE: 0.47 E9/L (ref 0.1–0.95)
MONOCYTES RELATIVE PERCENT: 10.4 % (ref 2–12)
MONOCYTES RELATIVE PERCENT: 5.3 % (ref 2–12)
MYELOCYTE PERCENT: 0.9 % (ref 0–0)
NEUTROPHILS ABSOLUTE: 2.75 E9/L (ref 1.8–7.3)
NEUTROPHILS ABSOLUTE: 4.2 E9/L (ref 1.8–7.3)
NEUTROPHILS RELATIVE PERCENT: 61.2 % (ref 43–80)
NEUTROPHILS RELATIVE PERCENT: 73.7 % (ref 43–80)
OVALOCYTES: ABNORMAL
PDW BLD-RTO: 20.9 FL (ref 11.5–15)
PDW BLD-RTO: 21.3 FL (ref 11.5–15)
PHOSPHORUS: 4.1 MG/DL (ref 2.5–4.5)
PLATELET # BLD: 63 E9/L (ref 130–450)
PLATELET # BLD: 81 E9/L (ref 130–450)
PLATELET CONFIRMATION: NORMAL
PLATELET CONFIRMATION: NORMAL
PMV BLD AUTO: 11.4 FL (ref 7–12)
PMV BLD AUTO: 12.3 FL (ref 7–12)
POIKILOCYTES: ABNORMAL
POIKILOCYTES: ABNORMAL
POLYCHROMASIA: ABNORMAL
POLYCHROMASIA: ABNORMAL
POTASSIUM SERPL-SCNC: 5.4 MMOL/L (ref 3.5–5)
R (REACTION TIME): 7.1 MIN (ref 5–10)
RBC # BLD: 2.31 E12/L (ref 3.8–5.8)
RBC # BLD: 2.34 E12/L (ref 3.8–5.8)
SCHISTOCYTES: ABNORMAL
SODIUM BLD-SCNC: 131 MMOL/L (ref 132–146)
TARGET CELLS: ABNORMAL
TOTAL PROTEIN: 6.6 G/DL (ref 6.4–8.3)
WBC # BLD: 4.5 E9/L (ref 4.5–11.5)
WBC # BLD: 5.6 E9/L (ref 4.5–11.5)

## 2020-08-04 PROCEDURE — 97535 SELF CARE MNGMENT TRAINING: CPT

## 2020-08-04 PROCEDURE — 82140 ASSAY OF AMMONIA: CPT

## 2020-08-04 PROCEDURE — 86901 BLOOD TYPING SEROLOGIC RH(D): CPT

## 2020-08-04 PROCEDURE — 85347 COAGULATION TIME ACTIVATED: CPT

## 2020-08-04 PROCEDURE — 84100 ASSAY OF PHOSPHORUS: CPT

## 2020-08-04 PROCEDURE — 85384 FIBRINOGEN ACTIVITY: CPT

## 2020-08-04 PROCEDURE — 83615 LACTATE (LD) (LDH) ENZYME: CPT

## 2020-08-04 PROCEDURE — 85576 BLOOD PLATELET AGGREGATION: CPT

## 2020-08-04 PROCEDURE — 82962 GLUCOSE BLOOD TEST: CPT

## 2020-08-04 PROCEDURE — 86900 BLOOD TYPING SEROLOGIC ABO: CPT

## 2020-08-04 PROCEDURE — 86850 RBC ANTIBODY SCREEN: CPT

## 2020-08-04 PROCEDURE — 6370000000 HC RX 637 (ALT 250 FOR IP): Performed by: INTERNAL MEDICINE

## 2020-08-04 PROCEDURE — P9016 RBC LEUKOCYTES REDUCED: HCPCS

## 2020-08-04 PROCEDURE — 80048 BASIC METABOLIC PNL TOTAL CA: CPT

## 2020-08-04 PROCEDURE — 86923 COMPATIBILITY TEST ELECTRIC: CPT

## 2020-08-04 PROCEDURE — 36430 TRANSFUSION BLD/BLD COMPNT: CPT

## 2020-08-04 PROCEDURE — 99223 1ST HOSP IP/OBS HIGH 75: CPT | Performed by: INTERNAL MEDICINE

## 2020-08-04 PROCEDURE — 97166 OT EVAL MOD COMPLEX 45 MIN: CPT

## 2020-08-04 PROCEDURE — 99233 SBSQ HOSP IP/OBS HIGH 50: CPT | Performed by: INTERNAL MEDICINE

## 2020-08-04 PROCEDURE — 2580000003 HC RX 258: Performed by: INTERNAL MEDICINE

## 2020-08-04 PROCEDURE — 85025 COMPLETE CBC W/AUTO DIFF WBC: CPT

## 2020-08-04 PROCEDURE — 83735 ASSAY OF MAGNESIUM: CPT

## 2020-08-04 PROCEDURE — 6360000002 HC RX W HCPCS: Performed by: INTERNAL MEDICINE

## 2020-08-04 PROCEDURE — 36415 COLL VENOUS BLD VENIPUNCTURE: CPT

## 2020-08-04 PROCEDURE — 6370000000 HC RX 637 (ALT 250 FOR IP): Performed by: STUDENT IN AN ORGANIZED HEALTH CARE EDUCATION/TRAINING PROGRAM

## 2020-08-04 PROCEDURE — APPSS60 APP SPLIT SHARED TIME 46-60 MINUTES: Performed by: NURSE PRACTITIONER

## 2020-08-04 PROCEDURE — 2060000000 HC ICU INTERMEDIATE R&B

## 2020-08-04 PROCEDURE — 80076 HEPATIC FUNCTION PANEL: CPT

## 2020-08-04 RX ORDER — 0.9 % SODIUM CHLORIDE 0.9 %
20 INTRAVENOUS SOLUTION INTRAVENOUS ONCE
Status: DISCONTINUED | OUTPATIENT
Start: 2020-08-04 | End: 2020-08-07 | Stop reason: HOSPADM

## 2020-08-04 RX ADMIN — INSULIN LISPRO 6 UNITS: 100 INJECTION, SOLUTION INTRAVENOUS; SUBCUTANEOUS at 01:23

## 2020-08-04 RX ADMIN — LACTULOSE 20 G: 20 SOLUTION ORAL at 22:25

## 2020-08-04 RX ADMIN — RIFAXIMIN 200 MG: 200 TABLET ORAL at 22:26

## 2020-08-04 RX ADMIN — INSULIN LISPRO 6 UNITS: 100 INJECTION, SOLUTION INTRAVENOUS; SUBCUTANEOUS at 22:27

## 2020-08-04 RX ADMIN — INSULIN GLARGINE 15 UNITS: 100 INJECTION, SOLUTION SUBCUTANEOUS at 22:27

## 2020-08-04 RX ADMIN — INSULIN LISPRO 9 UNITS: 100 INJECTION, SOLUTION INTRAVENOUS; SUBCUTANEOUS at 15:16

## 2020-08-04 RX ADMIN — INSULIN GLARGINE 20 UNITS: 100 INJECTION, SOLUTION SUBCUTANEOUS at 10:00

## 2020-08-04 RX ADMIN — RIFAXIMIN 200 MG: 200 TABLET ORAL at 15:15

## 2020-08-04 RX ADMIN — RIFAXIMIN 200 MG: 200 TABLET ORAL at 10:00

## 2020-08-04 RX ADMIN — SODIUM CHLORIDE, PRESERVATIVE FREE 10 ML: 5 INJECTION INTRAVENOUS at 22:26

## 2020-08-04 RX ADMIN — INSULIN LISPRO 6 UNITS: 100 INJECTION, SOLUTION INTRAVENOUS; SUBCUTANEOUS at 18:27

## 2020-08-04 RX ADMIN — INSULIN LISPRO 6 UNITS: 100 INJECTION, SOLUTION INTRAVENOUS; SUBCUTANEOUS at 10:00

## 2020-08-04 RX ADMIN — CIPROFLOXACIN 400 MG: 2 INJECTION, SOLUTION INTRAVENOUS at 10:00

## 2020-08-04 RX ADMIN — INSULIN LISPRO 6 UNITS: 100 INJECTION, SOLUTION INTRAVENOUS; SUBCUTANEOUS at 05:18

## 2020-08-04 RX ADMIN — CIPROFLOXACIN 400 MG: 2 INJECTION, SOLUTION INTRAVENOUS at 22:22

## 2020-08-04 ASSESSMENT — PAIN SCALES - GENERAL
PAINLEVEL_OUTOF10: 0
PAINLEVEL_OUTOF10: 0

## 2020-08-04 NOTE — CONSULTS
Inpatient Hematology/Oncology Consult Note    Reason for Visit: Consultation on a patient with Anemia and thrombocytopenia    Referring Physician:  Pramod Venegas MD    PCP:  Tanisha Ramirez MD    History of Present Illness:  62 y.o. male with a past medical hx of HTN, HLD, DM, and hx blood transfusions. Admitted to ED on 07/31/2020 with hepatic encephalopathy and AMS. COVID-19 negative. On 07/31/2020:   Hb 8.4, Hct 25.0, MCV 89.6, WBC 5.1,   Reticulocytes 1.8%  Haptoglobin <10 ()  Peripheral Blood Smear:   Platelets are present in decreased numbers and display normal morphology.    Red blood cells are normocytic normochromic with moderate to marked anisopoikilocytosis including echinocytes, target cells and schistocytes.    White blood cells are morphologically and quantitatively unremarkable.    Immature forms or blasts are absent. CT head 07/31/2020:   Mild to moderate generalized atrophy and moderate chronic ischemic/degenerative changes in the white matter for the patient's age. There is no acute process. Significant degenerative changes in the cervical spine most pronounced C5-6. There is moderately severe stenosis at this level. There are no acute fractures     CT cervical spine 07/31/2020:   Mild to moderate generalized atrophy and moderate chronic ischemic/degenerative changes in the white matter for the patient's age. There is no acute process. Significant degenerative changes in the cervical spine most pronounced C5-6. There is moderately severe stenosis at this level. There are no acute fractures     On 08/01/2020:   Fe 160, TIBC 155, FeSat 103%, Ferritin 5,839    Abdominal US 08/01/2020: Moderate amount of free fluid in the abdomen. Complex cystic and solid mass posterior to the spleen. Recommend contrast-enhanced CT of the abdomen and pelvis for further evaluation if not previously performed.     CT abdomen/pelvis 08/02/2020:   Advanced liver cirrhosis with the moderate ascites. No mass in the area of the left upper quadrant. Cystic structure posterior to the spleen seen in the ultrasound related with the fundal region of the stomach. Multiple gallstones . CT scan area in the medial aspect of the head of the pancreas can be additionally evaluated with multiphase MRI LAVA sequence/MRCP evaluation. On 08/03/2020:  Hb 5.6, Hct 17.2, MCV 95.0, WBC 5.2, PLT 66  D-Dimer 600  Fibrinogen 205 (225-540)  PT/INR 20.8/1.8  APTT 44.0 (24.5-35.1)    On 08/04/2020:  Hb 7.3, Hct 22.2, MCV 96.1, WBC 4.5, PLT 63    TEG test:  Reaction time 7.1 (5.0-10.0)  Clotting time 1.5 (1.0-3.0)  Clot Strength 68.7 (59.0-74.0)  Max Amplitude 44.6 (50.0-70.0)  G-TEG 4.0 (4.5-11.0)  EPL-TEG 4.7(0.0-15.0)  LY30 (Fibrinolysis) 4.7 (0.0-8.0)    Review of Systems;  CONSTITUTIONAL: No fever, chills. Good appetite and fair level. ENMT: Eyes: No diplopia; Nose: No epistaxis. Mouth: No sore throat. RESPIRATORY: No hemoptysis, shortness of breath, cough. CARDIOVASCULAR: No chest pain, palpitations. GASTROINTESTINAL: No nausea/vomiting, abdominal pain, diarrhea/constipation. GENITOURINARY: No dysuria, urinary frequency, hematuria. NEURO: No syncope, presyncope, headache. Remainder:  ROS NEGATIVE    Past Medical History:      Diagnosis Date    Diabetes mellitus ([address]     diet controlled    History of blood transfusion 12/2014    during back surgery    Hyperlipidemia     Hypertension        Past Surgical History:      Procedure Laterality Date    BACK SURGERY  2014    fusion lumbar    ELBOW SURGERY  2014    aspirated with infection     HERNIA REPAIR      OTHER SURGICAL HISTORY  6 16 16    stage 1 4 day percutaneous trial medtronic lumbar spinal cord stimulator    OTHER SURGICAL HISTORY N/A 10/10/2016    surgical implantation medtronic lumbar spinal cord stimulator electrodfe and generator       Family History:  No family history on file.     Medications:  Reviewed and reconciled. Social History:  Social History     Socioeconomic History    Marital status:      Spouse name: Not on file    Number of children: Not on file    Years of education: Not on file    Highest education level: Not on file   Occupational History    Not on file   Social Needs    Financial resource strain: Not on file    Food insecurity     Worry: Not on file     Inability: Not on file    Transportation needs     Medical: Not on file     Non-medical: Not on file   Tobacco Use    Smoking status: Former Smoker     Years: 4.00    Smokeless tobacco: Current User     Types: Chew   Substance and Sexual Activity    Alcohol use: Yes     Comment: ocass    Drug use: No    Sexual activity: Not on file   Lifestyle    Physical activity     Days per week: Not on file     Minutes per session: Not on file    Stress: Not on file   Relationships    Social connections     Talks on phone: Not on file     Gets together: Not on file     Attends Mandaen service: Not on file     Active member of club or organization: Not on file     Attends meetings of clubs or organizations: Not on file     Relationship status: Not on file    Intimate partner violence     Fear of current or ex partner: Not on file     Emotionally abused: Not on file     Physically abused: Not on file     Forced sexual activity: Not on file   Other Topics Concern    Not on file   Social History Narrative    Not on file       Allergies:  No Known Allergies    Physical Exam:  BP (!) 111/56   Pulse 86   Temp 98.7 °F (37.1 °C) (Oral)   Resp 18   Ht 6' 2\" (1.88 m)   Wt 151 lb (68.5 kg)   SpO2 98%   BMI 19.39 kg/m²   GENERAL: Alert, oriented x 3, not in acute distress. HEENT: PERRLA; EOMI. Oropharynx clear. NECK: Supple. Without lymphadenopathy. LUNGS: Diminished bilaterally. No wheezing, crackles or ronchi. CARDIOVASCULAR: Regular rate. No murmurs, rubs or gallops. ABDOMEN: Soft. Non-tender, non-distended.  Positive bowel sounds. EXTREMITIES: Without clubbing, cyanosis, or edema. NEUROLOGIC: No focal deficits. ECOG PS 1    Recent Labs     08/03/20 1941 08/03/20  2355 08/04/20  1126   WBC 5.2  --  4.5   RBC 1.81*  --  2.31*   HGB 5.6* 5.6* 7.3*   HCT 17.2* 16.8* 22.2*   MCV 95.0  --  96.1   MCH 30.9  --  31.6   MCHC 32.6  --  32.9   RDW 23.6*  --  21.3*   PLT 66*  --  63*   MPV 11.8  --  11.4        Recent Labs     08/02/20  0442 08/03/20  0450 08/03/20 1941 08/04/20  1126    133 131* 131*   K 3.9 5.4* 4.8 5.4*    102 99 99   CO2 20* 21* 21* 20*   BUN 33* 32* 35* 33*   CREATININE 1.3* 1.8* 1.8* 1.6*   GLUCOSE 303* 250* 217* 230*   CALCIUM 9.5 9.5 9.8 9.7   PROT 6.5 6.2*  --  6.6   LABALBU 2.6* 2.7*  --  2.6*   BILITOT 5.1* 4.8*  --  3.8*   ALKPHOS 161* 158*  --  177*   AST 54* 55*  --  58*   ALT 48* 48*  --  45*        Impression/Plan:  62 y.o. male with hx of anemia and thrombocytopenia, [facility] Dr. Grayson Butts    Admitted to ED on 07/31/2020 with hepatic encephalopathy and AMS. COVID-19 negative. On 07/31/2020:   Hb 8.4, Hct 25.0, MCV 89.6, WBC 5.1,   Reticulocytes 1.8%  Haptoglobin <10 ()  Peripheral Blood Smear:   Platelets are present in decreased numbers and display normal morphology.    Red blood cells are normocytic normochromic with moderate to marked anisopoikilocytosis including echinocytes, target cells and schistocytes.    White blood cells are morphologically and quantitatively unremarkable.    Immature forms or blasts are absent. CT head 07/31/2020:   Mild to moderate generalized atrophy and moderate chronic ischemic/degenerative changes in the white matter for the patient's age. There is no acute process. Significant degenerative changes in the cervical spine most pronounced C5-6. There is moderately severe stenosis at this level.    There are no acute fractures     CT cervical spine 07/31/2020:   Mild to moderate generalized atrophy and moderate chronic ischemic/degenerative changes in the white matter for the patient's age. There is no acute process. Significant degenerative changes in the cervical spine most pronounced C5-6. There is moderately severe stenosis at this level. There are no acute fractures     On 08/01/2020:   Fe 160, TIBC 155, FeSat 103%, Ferritin 5,839    Abdominal US 08/01/2020: Moderate amount of free fluid in the abdomen. Complex cystic and solid mass posterior to the spleen. Recommend contrast-enhanced CT of the abdomen and pelvis for further evaluation if not previously performed. CT abdomen/pelvis 08/02/2020:   Advanced liver cirrhosis with the moderate ascites. No mass in the area of the left upper quadrant. Cystic structure posterior to the spleen seen in the ultrasound related with the fundal region of the stomach. Multiple gallstones . CT scan area in the medial aspect of the head of the pancreas can be additionally evaluated with multiphase MRI LAVA sequence/MRCP evaluation. On 08/03/2020:  Hb 5.6, Hct 17.2, MCV 95.0, WBC 5.2, PLT 66  D-Dimer 600  Fibrinogen 205 (225-540)  PT/INR 20.8/1.8  APTT 44.0 (24.5-35.1)    On 08/04/2020:  Hb 7.3, Hct 22.2, MCV 96.1, WBC 4.5, PLT 63    TEG test:  Reaction time 7.1 (5.0-10.0)  Clotting time 1.5 (1.0-3.0)  Clot Strength 68.7 (59.0-74.0)  Max Amplitude 44.6 (50.0-70.0)  G-TEG 4.0 (4.5-11.0)  EPL-TEG 4.7(0.0-15.0)  LY30 (Fibrinolysis) 4.7 (0.0-8.0)    He has been seing Dr. Kacy Perez at [facility] for anemia, hypergammaglobulinemia, and thrombocytopenia. Bone marrow aspirate and biopsy done on 07/13/2020 at [facility].    NORMOCELLULAR MARROW WITH TRILINEAGE HEMATOPOIESIS  BLASTS ARE NOT SIGNIFICANTLY INCREASED  Flow cytometric immunophenotypic studies performed on the bone marrow demonstrate many maturing granulocytes, less than 1% CD34-positive blasts, approximately 5% CD14-positive monocytes, a moderate number of heterogeneous T-cells, a few natural killer cells, a few

## 2020-08-04 NOTE — PROGRESS NOTES
Occupational Therapy  OCCUPATIONAL THERAPY INITIAL EVALUATION        Date:2020  Patient Name: Zahraa Soni  MRN: [id number]  : 1960  Room: [address] [place] [address]-A    Referring Physician:  Shamar Escobar MD    Evaluating OT:  ALICIA Downey, OTR/L #[id number]      AM-PAC Daily Activity Raw Score:  15/24  Recommended Adaptive Equipment:  TBD as pt progresses     Reason for Admission:  Pt was transferred from a SNF after falling OOB    Diagnosis:  Hepatic Encephalopathy, Altered Mental Status, Closed Head Injury     Procedures this admission:  None     Pertinent Medical History:  DM, HTN, Lumbar Fusion , Implantation of Lumbar Cord Stimulator       Precautions:  Falls  TSM  Left Foot Drop  Griffin Catheter  Carb Control Diet    Pt is a Fair historian - most information obtained from Chart/previous therapy records    Home Living: Pt was transferred from a SNF.  (Unable to report LOS in days vs weeks vs months despite VCs)    Bathroom setup:  Walk- In-Shower, High Commode, Grab bars throughout  Equipment owned:  ?? Prior Level of Function:  Received assist w/ ADLs, Transfers and Mobility using [facility] for ambulation.  W/C for extended distances  Driving:  No  Occupation:  None reported    Pain Level:  Denies pain;  Nsg Notified   Additional Complaints:  General weakness    Vitals/Lab Values:  /60, O2 sats > 95% on Room Air  Hgb 5.6 this AM - Received 1 unit of blood, 1 unit pending this PM    Cognition: A & O x 2 - generally oriented to Self, place, month, year, place and situation   Able to Follow Multi-Step Commands w/ Min-Mod VCs   Memory:  fair    Sequencing:  fair    Problem solving:  fair    Judgement/safety:  fair   Additional Comments:  Pt was pleasant, cooperative       Functional Assessment:   Initial Eval Status  Date: 20 Treatment Status  Date: Short Term/Long Term Goals  Treatment frequency: PRN 2-4 x/week  1-2 weeks   Feeding Set up    Able to feed self, assist to open containers on tray  NA Grooming SUP/Set up    Able to complete tasks after set up while seated EOB - unable to ambulate to or stand at sink for ax d/t general weakness/anemia    Close SUP  Standing At The Sink   UB Dressing Min A/Set up    Required Min A to don/doff gown seated EOB    SUP   LB Dressing Mod A    Required Mod A to don socks, Min A for dynamic standing balance + Min A to don pants   Min A   Bathing NT      Min A   Toileting Max A    Required Max A for Bowel hygiene, use of Bedpan, Mod A for clothing adjustment    Min A   Bed Mobility  Rolling:  Min A  Repositioning:  Min A   Supine to Sit:  Min A    Sit to Supine:  Min A     Min A/VCs      Mod I   Functional Transfers Sit to stand:  Min A  Stand to sit:  Min A      From EOB 2x  Min VCs/Pt ed re: safety/hand placement    SUP   Functional Mobility Min A w/ 88 99degrees Custom    Short distance along EOB, limited by weakness, anemia    SUP   Balance Sitting:      Static:  Remote SUP EOB    Dynamic:  Close SUP w/ functional ax EOB    Standing:      Static:  Min A w/ 88 99degrees Custom    Dynamic:  Min A w/ functional ax/mobility w/ 88 99degrees Custom       Activity Tolerance Fair  Limited by Weakness/anemia    Tolerated Sitting:  EOB ~ 15 mins w/ functional ax     Tolerated Standing:  ~ 2-3 mins w/ functional ax w/ 88 99degrees Custom       Visual/  Perceptual WFL  Glasses:  Reading      Hearing WFL  Hearing Aids  No       Hand dominance: Right    UE ROM: RUE:  WFL      LUE:  WFL    Strength: RUE: grossly 4+/5     LUE: grossly 4+/5     Strength:  WFL Shaka UEs    Fine Motor Coordination:  WFL Shaka UEs    Sensation:  Denies numbness or tingling Shaka UEs  Tone:  WFL Shaka UEs  Edema:  None Noted                            Upon arrival, pt was found in supine. He was agreeable to participate in therapeutic ax. No Family was present during session. Received permission from RN prior to engaging pt in OT services.       Treatment:      Provided Skilled SUP/Assist w/ Pt safety, Proper Positioning, ADLs, Functional Transfers and Functional Mobility as noted above, as well as set up and clean up for session. Skilled monitoring of Vitals and pts response to treatment. Consulted RN    -- Education:  Provided Extensive Pt/Family ed re: Purpose of OT services;  OT Plan of Care;  Techs for improved Safety/Safety Awareness w/ Functional Activity/Mobility; Walker safety w/ Functional Activity/Mobility;  Use of DME/AD/Adaptive equip/techs to improve safety/IND with Functional Ax; Recommendations for Continued Participation in OT services during hospitalization and at D/C - SNF      Oriented pt to Day, Date, Place and Situation    Pt and/or Family verbalized/demonstrated a Good(-) understanding of education provided. Will Review PRN. At the end of the session, patient was properly positioned in Semi-Supine. Call light and phone within reach, all lines and tubes intact. Oriented pt to call bell. Made all appropriate Environmental Modifications to facilitate pt's level of IND and safety. All needs met. Bed Alarm activated.            Assessment of current deficits   Functional mobility [x]  ADLs [x] Strength [x]  Cognition [x]  Functional transfers  [x] IADLs [x] Safety Awareness [x]  Endurance [x]  Fine Motor Coordination [] Balance [x] Vision/perception [] Sensation []   Gross Motor Coordination [] ROM [] Delirium []                  Motor Control []    Plan of Care:   ADL retraining [x]   Equipment needs [x]   Neuromuscular re-education [] Energy Conservation Techniques [x]  Functional Transfer training [x] Patient and/or Family Education [x]  Functional Mobility training [x]  Environmental Modifications [x]  Cognitive re-training [x]   Compensatory techniques for ADLs [x]  Splinting Needs []   Positioning to improve overall function [x]   Therapeutic Activity [x]   Therapeutic Exercise  [x]  Visual/Perceptual: []    Delirium prevention/treatment  []  Other:  [x]    Pt would benefit from continued skilled OT services to increase safety and independence with completion of ADL/IADL tasks for functional independence and quality of life. Pt/Family actively participated in the establishment of goals. Rehab Potential:  Good(-) for established goals    Patient / Family Goal:  Not stated at this time     Patient and/or Family were instructed on Functional Diagnosis, Prognosis/Goals and OT Plan of Care. Demonstrated Good(-) understanding. Evaluation Time includes thorough review of current medical information, gathering information on past medical history/social history and prior level of function, completion of standardized testing/informal observation of tasks, assessment of data and education on plan of care and goals.      Eval Complexity: Mod  Profile and History - Mod  Assessment of Occupational Performance and Identification of Deficits - Mod  Clinical Decision Making - Mod       Mod Evaluation + 20 timed treatment minutes    Time In:  1329              Time Out:  9576    Treatment Charges: Mins Units   Ther Ex  25347     Manual Therapy 74541     Thera Activities [id number]     ADL/Home Mgt 32471 20 2   Neuro Re-ed [id number]     Group Therapy      Orthotic manage/training  78179     Non-Billable Time     Total Timed Treatment 20 [address] [place], [place], OTR/L #[id number]

## 2020-08-04 NOTE — PROGRESS NOTES
[facility]  Internal Medicine Residency / [address]    Attending Physician Statement  I have discussed the case, including pertinent history and exam findings with the resident and the team.  I have seen and examined the patient, reviewed meds and pertinent labs and the key elements of the encounter have been performed by me. I agree with the assessment, plan and orders as documented by the resident. Patient unchanged clinically, BUN 35 cr 1.8, Na+ 131, has increased PT/PTT and D-dimer, low fibrinogen. Hb 5.6,  Agree with PRBC transfusion and hematology consult. Remainder of medical problems as per resident note.       Mino Freeman  Internal Medicine Residency Faculty

## 2020-08-05 LAB
ALBUMIN SERPL-MCNC: 2.9 G/DL (ref 3.5–5.2)
ALP BLD-CCNC: 187 U/L (ref 40–129)
ALT SERPL-CCNC: 48 U/L (ref 0–40)
ANION GAP SERPL CALCULATED.3IONS-SCNC: 13 MMOL/L (ref 7–16)
ANISOCYTOSIS: ABNORMAL
AST SERPL-CCNC: 62 U/L (ref 0–39)
BASOPHILS ABSOLUTE: 0 E9/L (ref 0–0.2)
BASOPHILS RELATIVE PERCENT: 0.6 % (ref 0–2)
BILIRUB SERPL-MCNC: 3.6 MG/DL (ref 0–1.2)
BILIRUBIN DIRECT: 1.1 MG/DL (ref 0–0.3)
BILIRUBIN, INDIRECT: 2.5 MG/DL (ref 0–1)
BLOOD CULTURE, ROUTINE: NORMAL
BUN BLDV-MCNC: 35 MG/DL (ref 6–20)
BURR CELLS: ABNORMAL
CALCIUM SERPL-MCNC: 10.2 MG/DL (ref 8.6–10.2)
CHLORIDE BLD-SCNC: 104 MMOL/L (ref 98–107)
CO2: 21 MMOL/L (ref 22–29)
CREAT SERPL-MCNC: 1.5 MG/DL (ref 0.7–1.2)
CULTURE, BLOOD 2: NORMAL
EOSINOPHILS ABSOLUTE: 0.28 E9/L (ref 0.05–0.5)
EOSINOPHILS RELATIVE PERCENT: 4.4 % (ref 0–6)
GFR AFRICAN AMERICAN: 58
GFR NON-AFRICAN AMERICAN: 48 ML/MIN/1.73
GLUCOSE BLD-MCNC: 79 MG/DL (ref 74–99)
HCT VFR BLD CALC: 22.2 % (ref 37–54)
HEMOGLOBIN: 7.4 G/DL (ref 12.5–16.5)
LYMPHOCYTES ABSOLUTE: 1.13 E9/L (ref 1.5–4)
LYMPHOCYTES RELATIVE PERCENT: 17.7 % (ref 20–42)
MAGNESIUM: 1.9 MG/DL (ref 1.6–2.6)
MCH RBC QN AUTO: 32.2 PG (ref 26–35)
MCHC RBC AUTO-ENTMCNC: 33.3 % (ref 32–34.5)
MCV RBC AUTO: 96.5 FL (ref 80–99.9)
METER GLUCOSE: 127 MG/DL (ref 74–99)
METER GLUCOSE: 267 MG/DL (ref 74–99)
METER GLUCOSE: 283 MG/DL (ref 74–99)
METER GLUCOSE: 317 MG/DL (ref 74–99)
METER GLUCOSE: 342 MG/DL (ref 74–99)
METER GLUCOSE: 365 MG/DL (ref 74–99)
METER GLUCOSE: 98 MG/DL (ref 74–99)
MONOCYTES ABSOLUTE: 0.5 E9/L (ref 0.1–0.95)
MONOCYTES RELATIVE PERCENT: 8 % (ref 2–12)
NEUTROPHILS ABSOLUTE: 4.35 E9/L (ref 1.8–7.3)
NEUTROPHILS RELATIVE PERCENT: 69 % (ref 43–80)
PDW BLD-RTO: 21.9 FL (ref 11.5–15)
PHOSPHORUS: 3.8 MG/DL (ref 2.5–4.5)
PLATELET # BLD: 70 E9/L (ref 130–450)
PLATELET CONFIRMATION: NORMAL
PMV BLD AUTO: 11.2 FL (ref 7–12)
POIKILOCYTES: ABNORMAL
POLYCHROMASIA: ABNORMAL
POTASSIUM SERPL-SCNC: 5 MMOL/L (ref 3.5–5)
PROMYELOCYTES PERCENT: 0.9 % (ref 0–0)
RBC # BLD: 2.3 E12/L (ref 3.8–5.8)
SODIUM BLD-SCNC: 138 MMOL/L (ref 132–146)
SOLUBLE TRANSFERRIN RECEPT: 1.3 MG/L (ref 2.2–5)
TEAR DROP CELLS: ABNORMAL
TOTAL PROTEIN: 6.8 G/DL (ref 6.4–8.3)
WBC # BLD: 6.3 E9/L (ref 4.5–11.5)

## 2020-08-05 PROCEDURE — 2060000000 HC ICU INTERMEDIATE R&B

## 2020-08-05 PROCEDURE — 6360000002 HC RX W HCPCS: Performed by: INTERNAL MEDICINE

## 2020-08-05 PROCEDURE — 84100 ASSAY OF PHOSPHORUS: CPT

## 2020-08-05 PROCEDURE — 82962 GLUCOSE BLOOD TEST: CPT

## 2020-08-05 PROCEDURE — 6370000000 HC RX 637 (ALT 250 FOR IP): Performed by: STUDENT IN AN ORGANIZED HEALTH CARE EDUCATION/TRAINING PROGRAM

## 2020-08-05 PROCEDURE — 36415 COLL VENOUS BLD VENIPUNCTURE: CPT

## 2020-08-05 PROCEDURE — 97530 THERAPEUTIC ACTIVITIES: CPT

## 2020-08-05 PROCEDURE — 80076 HEPATIC FUNCTION PANEL: CPT

## 2020-08-05 PROCEDURE — 6370000000 HC RX 637 (ALT 250 FOR IP): Performed by: INTERNAL MEDICINE

## 2020-08-05 PROCEDURE — 2Y41X5Z PACKING OF NASAL REGION USING PACKING MATERIAL: ICD-10-PCS | Performed by: STUDENT IN AN ORGANIZED HEALTH CARE EDUCATION/TRAINING PROGRAM

## 2020-08-05 PROCEDURE — 80048 BASIC METABOLIC PNL TOTAL CA: CPT

## 2020-08-05 PROCEDURE — 97535 SELF CARE MNGMENT TRAINING: CPT

## 2020-08-05 PROCEDURE — 85025 COMPLETE CBC W/AUTO DIFF WBC: CPT

## 2020-08-05 PROCEDURE — 83735 ASSAY OF MAGNESIUM: CPT

## 2020-08-05 PROCEDURE — 2580000003 HC RX 258: Performed by: INTERNAL MEDICINE

## 2020-08-05 PROCEDURE — 99232 SBSQ HOSP IP/OBS MODERATE 35: CPT | Performed by: INTERNAL MEDICINE

## 2020-08-05 RX ORDER — OXYMETAZOLINE HYDROCHLORIDE 0.05 G/100ML
2 SPRAY NASAL
Status: DISCONTINUED | OUTPATIENT
Start: 2020-08-05 | End: 2020-08-05

## 2020-08-05 RX ORDER — PETROLATUM 42 G/100G
OINTMENT TOPICAL 2 TIMES DAILY PRN
Status: DISCONTINUED | OUTPATIENT
Start: 2020-08-05 | End: 2020-08-07 | Stop reason: HOSPADM

## 2020-08-05 RX ADMIN — CIPROFLOXACIN 400 MG: 2 INJECTION, SOLUTION INTRAVENOUS at 08:36

## 2020-08-05 RX ADMIN — CIPROFLOXACIN 400 MG: 2 INJECTION, SOLUTION INTRAVENOUS at 21:16

## 2020-08-05 RX ADMIN — OXYMETAZOLINE HYDROCHLORIDE 2 SPRAY: 5 SPRAY NASAL at 15:19

## 2020-08-05 RX ADMIN — INSULIN GLARGINE 15 UNITS: 100 INJECTION, SOLUTION SUBCUTANEOUS at 21:15

## 2020-08-05 RX ADMIN — INSULIN LISPRO 9 UNITS: 100 INJECTION, SOLUTION INTRAVENOUS; SUBCUTANEOUS at 17:17

## 2020-08-05 RX ADMIN — RIFAXIMIN 200 MG: 200 TABLET ORAL at 21:16

## 2020-08-05 RX ADMIN — INSULIN GLARGINE 20 UNITS: 100 INJECTION, SOLUTION SUBCUTANEOUS at 08:37

## 2020-08-05 RX ADMIN — OXYMETAZOLINE HYDROCHLORIDE 2 SPRAY: 5 SPRAY NASAL at 16:06

## 2020-08-05 RX ADMIN — RIFAXIMIN 200 MG: 200 TABLET ORAL at 08:36

## 2020-08-05 RX ADMIN — SODIUM CHLORIDE, PRESERVATIVE FREE 10 ML: 5 INJECTION INTRAVENOUS at 08:36

## 2020-08-05 RX ADMIN — SODIUM CHLORIDE, PRESERVATIVE FREE 10 ML: 5 INJECTION INTRAVENOUS at 21:16

## 2020-08-05 RX ADMIN — OXYMETAZOLINE HYDROCHLORIDE 2 SPRAY: 5 SPRAY NASAL at 15:32

## 2020-08-05 RX ADMIN — LACTULOSE 20 G: 20 SOLUTION ORAL at 14:28

## 2020-08-05 RX ADMIN — OXYMETAZOLINE HYDROCHLORIDE 2 SPRAY: 5 SPRAY NASAL at 15:51

## 2020-08-05 RX ADMIN — OXYMETAZOLINE HYDROCHLORIDE 2 SPRAY: 5 SPRAY NASAL at 16:21

## 2020-08-05 RX ADMIN — INSULIN LISPRO 9 UNITS: 100 INJECTION, SOLUTION INTRAVENOUS; SUBCUTANEOUS at 01:29

## 2020-08-05 RX ADMIN — INSULIN LISPRO 12 UNITS: 100 INJECTION, SOLUTION INTRAVENOUS; SUBCUTANEOUS at 12:58

## 2020-08-05 RX ADMIN — OXYMETAZOLINE HYDROCHLORIDE 2 SPRAY: 5 SPRAY NASAL at 14:56

## 2020-08-05 RX ADMIN — LACTULOSE 20 G: 20 SOLUTION ORAL at 08:36

## 2020-08-05 RX ADMIN — RIFAXIMIN 200 MG: 200 TABLET ORAL at 14:28

## 2020-08-05 RX ADMIN — INSULIN LISPRO 12 UNITS: 100 INJECTION, SOLUTION INTRAVENOUS; SUBCUTANEOUS at 21:15

## 2020-08-05 RX ADMIN — LACTULOSE 20 G: 20 SOLUTION ORAL at 21:16

## 2020-08-05 RX ADMIN — OXYMETAZOLINE HYDROCHLORIDE 2 SPRAY: 5 SPRAY NASAL at 15:08

## 2020-08-05 ASSESSMENT — PAIN SCALES - GENERAL
PAINLEVEL_OUTOF10: 0

## 2020-08-05 NOTE — PROGRESS NOTES
[facility]  Internal Medicine Residency / [address]    Attending Physician Statement  I have discussed the case, including pertinent history and exam findings with the resident and the team.  I have seen and examined the patient, reviewed meds and pertinent labs and the key elements of the encounter have been performed by me. I agree with the assessment, plan and orders as documented by the resident. Patient has been experiencing persistent epistaxis for the past several days (first noted to me today). Hb 7.4, platelets 46,532. Will ask ENT to evaluate. Remainder of medical problems as per resident note.       [facility]  Internal Medicine Residency Faculty

## 2020-08-05 NOTE — PROGRESS NOTES
Occupational Therapy  OT BEDSIDE TREATMENT NOTE      Date:2020  Patient Name: Luz Maria Pickard  MRN: [id number]  : 1960  Room: [address] [place] [address]-A     Referring Physician:  Brandon Ureña MD     Evaluating OT:  ALICIA Franklin, OTR/L #[id number]     AM-PAC Daily Activity Raw Score:    Recommended Adaptive Equipment: w/w; TBA     Reason for Admission:  Pt was transferred from a SNF after falling OOB     Diagnosis:  Hepatic Encephalopathy, Altered Mental Status, Closed Head Injury     Procedures this admission:  None      Pertinent Medical History:  DM, HTN, Lumbar Fusion , Implantation of Lumbar Cord Stimulator        Precautions:  Falls  TSM  Left Foot Drop  Griffin Catheter  Carb Control Diet     Pt is a Fair historian - most information obtained from Chart/previous therapy records     Home Living: Pt was transferred from a SNF.  (Unable to report LOS in days vs weeks vs months despite VCs)    Bathroom setup:  Walk- In-Shower, High Commode, Grab bars throughout  Equipment owned:  ??     Prior Level of Function:  Received assist w/ ADLs, Transfers and Mobility using [employer] for ambulation. W/C for extended distances  Driving:  No  Occupation:  None reported     Pain Level:  Denies pain;  Nsg Notified   Additional Complaints:  General weakness and bloody nose in AM on first attempt.      Cognition: A & O x 2 - generally oriented to Self, place, month, year, place and situation   Able to Follow Multi-Step Commands w/ Min-Mod VCs. Slow attention to task and slow response.                Memory:  fair               Sequencing:  fair               Problem solving:  fair               Judgement/safety:  fair   Additional Comments:  Pt was pleasant, cooperative     Functional Assessment:    Initial Eval Status  Date: 20 Treatment Status  Date: 20 Short Term/Long Term Goals  Treatment frequency: PRN 2-4 x/week  1-2 weeks   Feeding Set up     Able to feed self, assist to open containers on tray  Setup  NA Grooming SUP/Set up     Able to complete tasks after set up while seated EOB - unable to ambulate to or stand at sink for ax d/t general weakness/anemia     SBA; Min verbal prompting to increase hygiene when completing washing face/hair and combing hair while sitting up on the EOB. Close SUP  Standing At The Sink   UB Dressing Min A/Set up     Required Min A to don/doff gown seated EOB     SBA; Min verbal for proper positioning. SUP   LB Dressing Mod A     Required Mod A to don socks, Min A for dynamic standing balance + Min A to don pants   SBA; Pt able to sit up on the EOB to rocky/doff socks with increased time. Min A   Bathing NT        N/T; pt declined. Min A   Toileting Max A     Required Max A for Bowel hygiene, use of Bedpan, Mod A for clothing adjustment     Catheter present. Simulated toilet transfer with raised surface. Min A   Bed Mobility  Rolling:  Min A  Repositioning:  Min A   Supine to Sit:  Min A    Sit to Supine:  Min A      Min A/VCs      Rolling: SBA  Repositioning:  SBA   Supine to Sit:  SBA    Sit to Supine:  Sup     Pt able to transfer from supine to sitting position with increased time and due to safety pt placed back into supine with HOB. Mod I   Functional Transfers Sit to stand:  Min A  Stand to sit:  Min A       From EOB 2x  Min VCs/Pt ed re: safety/hand placement    Sit to stand:  Min A  Stand to sit:  Min A ;    Min verbal prompting for proper hand placement and body alignment to improve safety while utilizing w/w to maintain balance.       SUP   Functional Mobility Min A w/ [employer]     Short distance along EOB, limited by weakness, anemia     Min A w/w;    Due to weakness and L foot drop pt requires Min A, verbal prompting to slow pace and a w/w.     SUP   Balance Sitting:      Static:  Remote SUP EOB    Dynamic:  Close SUP w/ functional ax EOB     Standing:      Static:  Min A w/ [employer]    Dynamic:  Min A w/ functional ax/mobility w/ [employer]     Sitting:  SUP    Standing: Amandeep Thomas w/w     Activity Tolerance Fair  Limited by Weakness/anemia     Tolerated Sitting:  EOB ~ 15 mins w/ functional ax      Tolerated Standing:  ~ 2-3 mins w/ functional ax w/ [employer]    Fair; Increased verbal prompting needed to improve participation.      Visual/  Perceptual WFL  Glasses:  Reading   WFL     Hearing WFL  Hearing Aids  No  WFL        Hand dominance: Right     UE ROM:        RUE:     WFL                                          LUE:     WFL     Strength:        RUE:    grossly 4+/5                               LUE:    grossly 4+/5      Strength:  WFL Shaka UEs     Fine Motor Coordination:  WFL Shaka UEs     Sensation:  Denies numbness or tingling Shaka UEs  Tone:  WFL Shaka UEs  Edema:  None Noted                            Upon arrival, pt was found in supine. He was agreeable to participate in therapeutic ax. No Family was present during session. Received permission from RN prior to engaging pt in OT services.       Treatment: Provided Skilled SUP/Assist w/ Pt safety, Proper Positioning, ADLs, Functional Transfers and Functional Mobility as noted above, as well as set up and clean up for session. Skilled monitoring of Vitals and pts response to treatment.         At the end of the session, patient was properly positioned in Semi-Supine. Call light and phone within reach, all lines and tubes intact. Oriented pt to call bell. Made all appropriate Environmental Modifications to facilitate pt's level of IND and safety. All needs met.   Bed Alarm activated.            Time In:  1400            Time Out:  1424     Treatment Charges: Mins Units   Ther Ex  01943       Manual Therapy 68141       Thera Activities 48998 10 1   ADL/Home Mgt 49983 14 2   Neuro Re-ed 93391       Group Therapy        Orthotic manage/training  33967       Non-Billable Time       Total Timed Treatment 24 2 [address] [facility], [facility]  [id number]

## 2020-08-05 NOTE — PROGRESS NOTES
[facility]  Internal Medicine Residency Program  Progress Note - House Team 2    Patient:  Inga Cox 62 y.o. male   MRN: [id number]       Date of Service: 2020    Allergy: Patient has no known allergies. Subjective     Patient was seen and examined in AM. Patient AOx3, appears to be a little slower in conversation than yesterday. Had 1 non bloody bowel movement in AM.     Plan to obtain documents from [facility]     Objective     TEMPERATURE:  Current - Temp: 98 °F (36.7 °C); Max - Temp  Av.2 °F (36.8 °C)  Min: 98 °F (36.7 °C)  Max: 98.5 °F (36.9 °C)  RESPIRATIONS RANGE: Resp  Av.3  Min: 16  Max: 18  PULSE RANGE: Pulse  Av  Min: 84  Max: 95  BLOOD PRESSURE RANGE:  Systolic (95CVX), YMZ:193 , Min:106 , HMF:425   ; Diastolic (48ZOD), BQN:32, Min:58, Max:60    PULSE OXIMETRY RANGE: SpO2  Av.3 %  Min: 96 %  Max: 100 %    I & O - 24hr:    Intake/Output Summary (Last 24 hours) at 2020 1028  Last data filed at 2020 2122  Gross per 24 hour   Intake --   Output 400 ml   Net -400 ml     I/O last 3 completed shifts:  In: -   Out: 400 [Urine:400] No intake/output data recorded.    Weight change:     Physical Exam      Labs and Imaging Studies     CBC:   Recent Labs     20  1126 20  1516 20  0456   WBC 4.5 5.6 6.3   HGB 7.3* 7.3* 7.4*   HCT 22.2* 22.5* 22.2*   MCV 96.1 96.2 96.5   PLT 63* 81* 70*       BMP:    Recent Labs     20  1941 20  1126 20  0456   * 131* 138   K 4.8 5.4* 5.0   CL 99 99 104   CO2 21* 20* 21*   BUN 35* 33* 35*   CREATININE 1.8* 1.6* 1.5*   GLUCOSE 217* 230* 79       LIVER PROFILE:   Recent Labs     20  0450 20  1126 206   AST 55* 58* 62*   ALT 48* 45* 48*   BILIDIR 1.4* 1.3* 1.1*   BILITOT 4.8* 3.8* 3.6*   ALKPHOS 158* 177* 187*       PT/INR:   Recent Labs     20   PROTIME 20.8*   INR 1.8       APTT:   Recent Labs     20   APTT 44.0*       Fasting Lipid Panel:    No results found for: CHOL, TRIG, HDL    Notable Cultures:      Blood cultures   Blood Culture, Routine   Date Value Ref Range Status   2020 24 Hours no growth  Preliminary     Respiratory cultures No results found for: RESPCULTURE No results found for: LABGRAM  Urine   Urine Culture, Routine   Date Value Ref Range Status   2020   Final    <10,000 CFU/mL  Gram negative rods  Gram positive organism       Legionella No results found for: LABLEGI  C Diff PCR No results found for: CDIFPCR  Wound culture/abscess: No results for input(s): WNDABS in the last 72 hours. Tip culture:No results for input(s): CXCATHTIP in the last 72 hours. Xr Shoulder Right (min 2 Views)    Result Date: 2020  Patient MRN:  [id number] : 1960 Age: 62 years Gender: Male Order Date:  2020 9:00 AM EXAM: XR SHOULDER RIGHT (MIN 2 VIEWS), XR SHOULDER LEFT (MIN 2 VIEWS) NUMBER OF IMAGES:  4 INDICATION: Acute fall with bilateral shoulder pain. Trauma COMPARISON: None TECHNIQUE: 2 view right shoulder and two-view left shoulder. FINDINGS: No acute fracture or dislocation. Soft tissues unremarkable. Bones normally mineralized. No significant abnormal findings at either shoulder. Xr Hip Bilateral W Ap Pelvis (2 Views)    Result Date: 2020  Patient MRN:  [id number] : 1960 Age: 62 years Gender: Male Order Date:  2020 9:15 AM EXAM: XR HIP BILATERAL W AP PELVIS (2 VIEWS), XR FEMUR RIGHT (MIN 2 VIEWS), XR FEMUR LEFT (MIN 2 VIEWS) NUMBER OF IMAGES:  7 INDICATION: Acute fall with pelvic, bilateral hips and bilateral femur pain. Trauma COMPARISON: Pelvis dated 2020 TECHNIQUE: Bilateral hips and pelvis 3 views, right femur 2 views and left femur 2 views FINDINGS: No acute fracture or dislocation. Bones are normally mineralized. Hip joint spaces are relatively preserved. There are unremarkable soft tissues. Unremarkable pelvis, bilateral hips and bilateral femora.     Xr Femur Left (min 2 Views)    Result Date: 2020  Patient MRN:  [id number] : 1960 Age: 62 years Gender: Male Order Date:  2020 9:15 AM EXAM: XR HIP BILATERAL W AP PELVIS (2 VIEWS), XR FEMUR RIGHT (MIN 2 VIEWS), XR FEMUR LEFT (MIN 2 VIEWS) NUMBER OF IMAGES:  7 INDICATION: Acute fall with pelvic, bilateral hips and bilateral femur pain. Trauma COMPARISON: Pelvis dated 2020 TECHNIQUE: Bilateral hips and pelvis 3 views, right femur 2 views and left femur 2 views FINDINGS: No acute fracture or dislocation. Bones are normally mineralized. Hip joint spaces are relatively preserved. There are unremarkable soft tissues. Unremarkable pelvis, bilateral hips and bilateral femora. Xr Femur Right (min 2 Views)    Result Date: 2020  Patient MRN:  [id number] : 1960 Age: 62 years Gender: Male Order Date:  2020 9:15 AM EXAM: XR HIP BILATERAL W AP PELVIS (2 VIEWS), XR FEMUR RIGHT (MIN 2 VIEWS), XR FEMUR LEFT (MIN 2 VIEWS) NUMBER OF IMAGES:  7 INDICATION: Acute fall with pelvic, bilateral hips and bilateral femur pain. Trauma COMPARISON: Pelvis dated 2020 TECHNIQUE: Bilateral hips and pelvis 3 views, right femur 2 views and left femur 2 views FINDINGS: No acute fracture or dislocation. Bones are normally mineralized. Hip joint spaces are relatively preserved. There are unremarkable soft tissues. Unremarkable pelvis, bilateral hips and bilateral femora. Ct Head Wo Contrast    Result Date: 2020  Patient MRN:  [id number] : 1960 Age: 62 years Gender: Male Order Date:  2020 9:00 AM EXAM: CT HEAD WO CONTRAST, CT CERVICAL SPINE WO CONTRAST Technique: Low-dose CT  acquisition technique included one of following options; 1 . Automated exposure control, 2. Adjustment of MA and or KV according to patient's size. 3. Use of interactive reconstruction. Dosage: 1822 mGy-cm.  INDICATION:  Trauma Trauma COMPARISON: 2020 FINDINGS:  There is moderate chronic ischemic/degenerative change in Automated exposure control, 2. Adjustment of MA and or KV according to patient's size. 3. Use of interactive reconstruction. Dosage: 1822 mGy-cm. INDICATION:  Trauma Trauma COMPARISON: 1/7/2020 FINDINGS:  There is moderate chronic ischemic/degenerative change in the white matter for patient's age. There is some mild to moderate generalized atrophy. There is no mass effect edema or hemorrhage. There is complete opacification of the left maxillary sinus and considerable opacification of the left side of the sphenoid sinus. There is a small amount mucosal thickening left ethmoid air cells. No skull fractures are noted. There is slight anterior subluxation of C3 with respect to  C4 felt to be degenerative. There is slight anterior subluxation of C4 with respect to C5 felt to be degenerative. There is significant disc space narrowing at C5-6 and C6-7. There is focal kyphosis in this region. There are significant anterior spurs. There is slight retrolisthesis of C5 with respect to C6 felt to be degenerative. There is mild chronic compression deformity of the caudal endplate of C5 and superior endplate of C6. There are no fractures. At C2-3 there is right-sided facet arthrosis of a moderate degree. There are posterior spurs. Some right-sided facet arthrosis C3 -4 with mild stenosis and right foraminal narrowing. At C4-5 there is significant right-sided facet arthrosis. There is right lateral spurs. Mild stenosis and right foraminal narrowing. At C5-6 diffuse posterior spurs causes moderate to moderately severe stenosis. At C6-7 small posterior spurs cause mild stenosis    Mild to moderate generalized atrophy and moderate chronic ischemic/degenerative changes in the white matter for the patient's age. There is no acute process. Significant degenerative changes in the cervical spine most pronounced C5-6. There is moderately severe stenosis at this level.  There are no acute fractures    Ct Abdomen Pelvis W Iv Contrast the study. The calcified atheromatous changes are seen in the abdominal aorta with lack of tapering. There is good contrast runoff into the visualized proximal right and left superficial and profunda femoral arteries. Patient has a Griffin catheter in the bladder. Prostate gland seminal vesicles appear unremarkable. There is no free intraperitoneal air. Due the presence of ascites is difficult to evaluate for localized inflammatory process in the omental mesenteric fat planes. No significant thickening of bowel wall is observed the. There is some residual scar in the left lower lobe and in the right middle lobe. Patient previous laminectomy. There is a site of bone harvest in the right iliac bone. The there are degenerative disc disease in L3-4, L4-5 and L5-S1 levels. The canal is decompressed by the laminectomy from lower L2 through upper S1.     1. Advanced liver cirrhosis with the moderate ascites. 2. No mass in the area of the left upper quadrant. Cystic structure posterior to the spleen seen in the ultrasound related with the fundal region of the stomach. 3. Multiple gallstones . 4. CT scan area in the medial aspect of the head of the pancreas can be additionally evaluated with multiphase MRI LAVA sequence/MRCP evaluation. Xr Shoulder Left (min 2 Views)    Result Date: 2020  Patient MRN:  [id number] : 1960 Age: 62 years Gender: Male Order Date:  2020 9:00 AM EXAM: XR SHOULDER RIGHT (MIN 2 VIEWS), XR SHOULDER LEFT (MIN 2 VIEWS) NUMBER OF IMAGES:  4 INDICATION: Acute fall with bilateral shoulder pain. Trauma COMPARISON: None TECHNIQUE: 2 view right shoulder and two-view left shoulder. FINDINGS: No acute fracture or dislocation. Soft tissues unremarkable. Bones normally mineralized. No significant abnormal findings at either shoulder.     Xr Chest Portable    Result Date: 2020  Patient MRN: [id number] : 1960 Age:  62 years Gender: Male Order Date: 2020 9:00 AM Exam: XR CHEST PORTABLE Number of Images: 1 view Indication:  Acute fall with chest Trauma Comparison: 2020 FINDINGS: Indwelling T-spine neurostimulator. Heart and pulmonary vascularity normal. Lungs clear. Costophrenic angles sharp. Normal aorta. No acute cardiopulmonary findings. Us Abdomen Limited    Result Date: 2020  Patient MRN:  [id number] : 1960 Age: 62 years Gender: Male Order Date:  2020 2:03 PM EXAM: US ABDOMEN LIMITED NUMBER OF IMAGES:  31 INDICATION:  suspected cirrhosis - ascites? ?? Suspected cirrhosis - ascites? ?? COMPARISON: None TECHNIQUE: Rozanne Prows scale as well as color and spectral duplex sonography of the abdomen was performed. FINDINGS:        There is a moderate amount of free fluid in the abdomen which involves the right upper quadrant, right lower quadrant, and left upper quadrant. A complex cystic and solid mass is identified posterior to the spleen which measures 8.5 x 7.5 x 5.8 cm. 1. Moderate amount of free fluid in the abdomen. 2. Complex cystic and solid mass posterior to the spleen. Recommend contrast-enhanced CT of the abdomen and pelvis for further evaluation if not previously performed.          Medications     Current Meds:  Current Facility-Administered Medications   Medication Dose Route Frequency Provider Last Rate Last Dose    mineral oil-hydrophilic petrolatum (HYDROPHOR) ointment   Topical BID PRN Luis Angel Galicia DO        0.9 % sodium chloride bolus  20 mL Intravenous Once Yael Zhang MD   Stopped at 20 0418    insulin glargine (LANTUS) injection vial 20 Units  20 Units Subcutaneous Daily with breakfast Meet Bowie MD   20 Units at 20 0837    insulin lispro (HUMALOG) injection vial 0-18 Units  0-18 Units Subcutaneous Q4H Kristine Babb MD   9 Units at 20 0129    glucose (GLUTOSE) 40 % oral gel 15 g  15 g Oral PRN Karine Alexandra Shelby MD        dextrose 50 % IV solution  12.5 g Intravenous PRN Alin Mcgee MD  glucagon (rDNA) injection 1 mg  1 mg Intramuscular PRN Karine Alexandra Loyola MD        dextrose 5 % solution  100 mL/hr Intravenous PRN Karine Loyola MD        sodium chloride flush 0.9 % injection 10 mL  10 mL Intravenous PRN Lonnie Arriola II, MD   10 mL at 08/03/20 1024    insulin glargine (LANTUS) injection vial 15 Units  15 Units Subcutaneous Nightly Louise Funes MD   15 Units at 08/04/20 2227    rifaximin (XIFAXAN) tablet 200 mg  200 mg Oral TID Ricco Adams MD   200 mg at 08/05/20 0836    sodium chloride flush 0.9 % injection 10 mL  10 mL Intravenous 2 times per day Ricco Adams MD   10 mL at 08/05/20 0836    sodium chloride flush 0.9 % injection 10 mL  10 mL Intravenous PRN Ricco Adams MD        acetaminophen (TYLENOL) tablet 650 mg  650 mg Oral Q6H PRN Ricco Adams MD        Or   [facility] acetaminophen (TYLENOL) suppository 650 mg  650 mg Rectal Q6H PRN Ricco Adams MD        polyethylene glycol (GLYCOLAX) packet 17 g  17 g Oral Daily PRN Ricco Adams MD        promethazine (PHENERGAN) tablet 12.5 mg  12.5 mg Oral Q6H PRN Ricco Adams MD        Or    ondansetron [facility]) injection 4 mg  4 mg Intravenous Q6H PRN Ricco Adams MD        lactulose (CHRONULAC) 10 GM/15ML solution 20 g  20 g Oral TID Ricco Adams MD   20 g at 08/05/20 0836    ciprofloxacin (CIPRO) IVPB 400 mg  400 mg Intravenous Q12H Ricco Adams MD   Stopped at 08/05/20 0950       Continuous Infusions:   dextrose       Scheduled Meds:   sodium chloride  20 mL Intravenous Once    insulin glargine  20 Units Subcutaneous Daily with breakfast    insulin lispro  0-18 Units Subcutaneous Q4H    insulin glargine  15 Units Subcutaneous Nightly    rifaximin  200 mg Oral TID    sodium chloride flush  10 mL Intravenous 2 times per day    lactulose  20 g Oral TID    ciprofloxacin  400 mg Intravenous Q12H     PRN Meds: mineral oil-hydrophilic petrolatum, glucose, dextrose, glucagon (rDNA), dextrose, sodium chloride flush, sodium chloride flush, acetaminophen **OR** acetaminophen, polyethylene glycol, promethazine **OR** ondansetron      Resident's Assessment and Plan      Willem Gan is 62 y.o. male with a PMH of liver failure 2/2 alcohol use, CKD, pancytopenia splenomegaly presented to ED for hepatic encephalopathy      1. Acute metabolic encephalopathy, resolved. -hepatic encephalopathy, ammonia 117, presented with asterixis. Ammonia trending down, continue lactulose and rifaximin. 2. Liver cirrhosis 2/2 alcohol abuse, noted to have moderate ascites on CT abdomen, presented with elevated ammonia, PT/INR, low albumin. Continue Ciprofloxacin for   3. LAURO on CKD, resolving Cr trending down   4. Anemia, Hb drop to 5.4 this hospitalization. History of recurrent anemia over the past 6 months, patient initally required one transfusion a month. Infusion frequency slowly increased to every 7-10 days per wife. Patient has had some work up done at  [facility], obtain documents today.   -Per wife pt has had previous GI workup including colonoscopy, EGD and wireless pill. No signs of bleeding were noted. -Haptoglobin <10, indirect bilirubin elevated, peripheral smears significant for schistocytes. NO active sit of bleeding noted at this time. - obtain previous record   5. Poorly controlled diabetes, hx of type 2 diabetes mellitus. Per wife recently it has been poorly controlled. Noted to have increased insulin requirement. High ferritin levels play an important role in insulin resistance, patient's ferritin >5000. Continue high dose sliding scale Q4 with 20 units nightly and 15 units daily of lantus, increase as needed. 6. Iron overload. Iron studies concerning for iron overload 2/2 multiple transfusions. Needs evaluation, this increases insulin resistance and also puts patient at high risk for cardiomyopathy if transfusions continue at the same rate without chelation.        Peptic ulcer prophylaxis: Protonix   DVT Prophylaxis: PCDs  Disposition: Cont current care.      Benton Lerma MD PGY-2   Internal medicine resident  Attending Physician: Dr. Adrien Mcfarland

## 2020-08-05 NOTE — PLAN OF CARE
Problem: Falls - Risk of:  Goal: Will remain free from falls  Description: Will remain free from falls  Outcome: Met This Shift  Goal: Absence of physical injury  Description: Absence of physical injury  Outcome: Met This Shift     Problem: Skin Integrity:  Goal: Will show no infection signs and symptoms  Description: Will show no infection signs and symptoms  Outcome: Met This Shift  Goal: Absence of new skin breakdown  Description: Absence of new skin breakdown  Outcome: Met This Shift     Problem: Pain:  Goal: Pain level will decrease  Description: Pain level will decrease  Outcome: Met This Shift  Goal: Control of acute pain  Description: Control of acute pain  Outcome: Met This Shift  Goal: Control of chronic pain  Description: Control of chronic pain  Outcome: Met This Shift     Problem: Musculor/Skeletal Functional Status  Goal: Highest potential functional level  Outcome: Met This Shift  Goal: Absence of falls  Outcome: Met This Shift

## 2020-08-05 NOTE — CONSULTS
Otolaryngology  Consult Note    Patient's Name/Date of Birth: Terra Garcia / 1960 (1 y.o.)    Date: August 5, 2020     Chief Complaint: Epistaxis    HPI: 63 y/o M with left sided epistaxis. He reportedly had a NG placed a few days ago and has had intermittent bleeding from the left nares. States he has had trouble with nose bleeds all his life. He has been picking at his nose recently. He was admitted for altered mental status due to hepatic encephalopathy. He is not on anticoagulation and has not been hypertensive. Past Medical History:   Diagnosis Date    Diabetes mellitus ([address]     diet controlled    History of blood transfusion 12/2014    during back surgery    Hyperlipidemia     Hypertension        Past Surgical History:   Procedure Laterality Date    BACK SURGERY  2014    fusion lumbar    ELBOW SURGERY  2014    aspirated with infection     HERNIA REPAIR      OTHER SURGICAL HISTORY  6 16 16    stage 1 4 day percutaneous trial medtronic lumbar spinal cord stimulator    OTHER SURGICAL HISTORY N/A 10/10/2016    surgical implantation medtronic lumbar spinal cord stimulator electrodfe and generator       Prior to Admission medications    Medication Sig Start Date End Date Taking?  Authorizing Provider   white petrolatum OINT ointment Apply topically 2 times daily as needed   Yes Historical Provider, MD   Multiple Vitamins-Minerals (CENTRUM SILVER PO) Take 1 tablet by mouth daily   Yes Historical Provider, MD   furosemide (LASIX) 40 MG tablet Take 40 mg by mouth daily In the morning for edema   Yes Historical Provider, MD   glipiZIDE (GLUCOTROL) 5 MG tablet Take 2.5 mg by mouth daily In the morning for diabetes   Yes Historical Provider, MD   insulin lispro (HUMALOG KWIKPEN) 200 UNIT/ML SOPN pen Inject 5 Units into the skin 3 times daily (with meals)   Yes Historical Provider, MD   insulin glargine (LANTUS) 100 UNIT/ML injection vial Inject 15 Units into the skin nightly   Yes Historical Provider, MD   insulin glargine (LANTUS) 100 UNIT/ML injection vial Inject 30 Units into the skin every morning   Yes Historical Provider, MD   lactulose (CHRONULAC) 10 GM/15ML solution Take 30 mLs by mouth 4 times daily   Yes Historical Provider, MD   pantoprazole (PROTONIX) 40 MG tablet Take 40 mg by mouth 2 times daily For GERD   Yes Historical Provider, MD   sucralfate (CARAFATE) 1 GM tablet Take 1 g by mouth 4 times daily For gastric protection   Yes Historical Provider, MD   vitamin D (ERGOCALCIFEROL) 1.25 MG (55949 UT) CAPS capsule Take 50,000 Units by mouth once a week Give one capsule by mouth in the morning every Sunday for supplement . Due 08/02/2020   Yes Historical Provider, MD   insulin lispro (HUMALOG) 100 UNIT/ML injection vial Inject 1 Units into the skin 3 times daily (before meals) Inject as per sliding scale : If -175= 3 units, 176-200=4 units, 201-225= 5 units, 226-250= 6 units, 251-300= 8 units, 301-350= 10 units, 351-400=14 units. For  and greater CALL MD   Yes Historical Provider, MD   folic acid (FOLVITE) 1 MG tablet Take 1 mg by mouth daily   Yes Historical Provider, MD   cyanocobalamin 1000 MCG/ML injection Inject 1,000 mcg into the muscle every 14 days Every 2 weeks on Sunday. Due 08/09/2020   Yes Historical Provider, MD   metoprolol tartrate (LOPRESSOR) 50 MG tablet Take 25 mg by mouth 2 times daily    Yes Historical Provider, MD       No Known Allergies    No family history on file.     Social History     Socioeconomic History    Marital status:      Spouse name: Not on file    Number of children: Not on file    Years of education: Not on file    Highest education level: Not on file   Occupational History    Not on file   Social Needs    Financial resource strain: Not on file    Food insecurity     Worry: Not on file     Inability: Not on file    Transportation needs     Medical: Not on file     Non-medical: Not on file   Tobacco Use    Smoking status: Former Smoker Years:  4.00    Smokeless tobacco: Current User     Types: Chew   Substance and Sexual Activity    Alcohol use: Yes     Comment: ocass    Drug use: No    Sexual activity: Not on file   Lifestyle    Physical activity     Days per week: Not on file     Minutes per session: Not on file    Stress: Not on file   Relationships    Social connections     Talks on phone: Not on file     Gets together: Not on file     Attends Church service: Not on file     Active member of club or organization: Not on file     Attends meetings of clubs or organizations: Not on file     Relationship status: Not on file    Intimate partner violence     Fear of current or ex partner: Not on file     Emotionally abused: Not on file     Physically abused: Not on file     Forced sexual activity: Not on file   Other Topics Concern    Not on file   Social History Narrative    Not on file       Review of Systems:   CONSTITUTIONAL:  negative for  fevers, chills, fatigue and weight loss   EYES:  negative for  blurred vision, eye discharge and visual disturbance   HEENT:  negative for  hearing loss, tinnitus, ear drainage, earaches, nasal congestion, snoring, sore mouth, sore throat, hoarseness and voice change positive for epistaxis   RESPIRATORY:  negative for  dry cough, cough with sputum, dyspnea and wheezing   CARDIOVASCULAR:  negative for  chest pain, dyspnea   GASTROINTESTINAL:  negative for dysphagia and reflux   INTEGUMENT/BREAST:  negative for rash and skin lesion(s)   HEMATOLOGIC/LYMPHATIC:  negative for easy bruising, bleeding and lymphadenopathy   ALLERGIC/IMMUNOLOGIC:  negative for recurrent infections, urticaria and angioedema   ENDOCRINE:  negative for heat intolerance, cold intolerance and weight changes   MUSCULOSKELETAL:  negative for  myalgias and arthralgias     Physical Exam:  Vitals:    08/04/20 1900 08/05/20 0116 08/05/20 0816 08/05/20 1426   BP: 116/60 (!) 120/58 (!) 106/58 (!) 110/58   Pulse: 84 95 91 104 Resp: 18 18 16 15   Temp: 98.2 °F (36.8 °C) 98.5 °F (36.9 °C) 98 °F (36.7 °C) 98.3 °F (36.8 °C)   TempSrc: Oral Temporal Temporal Oral   SpO2: 99% 96% 100% 100%   Weight:       Height:           CONSTITUTIONAL:  awake, alert, cooperative, no apparent distress, and appears stated age   EYES:  Lids and lashes normal, pupils equal, round and reactive to light, extra ocular muscles intact, sclera clear  EARS: external ears without lesions, no drainage  NOSE: Nares normal. Septum deviated, large clot on right septum, left anterior septum with small area of irritation that bleeds on contact. Mucosa dry  No drainage or sinus tenderness. , no sinus tenderness  MOUTH: MMM no lesions, no blood in the posterior oropharynx  THROAT: normal voice,  NECK:  supple, symmetrical, trachea midline  HEMATOLOGIC/LYMPHATICS:  no cervical lymphadenopathy  LUNGS:  no increased work of breathing and good air exchange  CARDIOVASCULAR:  Normal rate    ABDOMEN: Soft, non-distended   SKIN: Warm, no rashes or edema     LABS:  CBC  Recent Labs     20  0456   WBC 6.3   HGB 7.4*   HCT 22.2*   PLT 70*     BMP  Recent Labs     206      K 5.0      CO2 21*   BUN 35*   CREATININE 1.5*   CALCIUM 10.2     Liver Function  Recent Labs     206   BILITOT 3.6*   BILIDIR 1.1*   AST 62*   ALT 48*   ALKPHOS 187*   PROT 6.8   LABALBU 2.9*     No results for input(s): LACTATE in the last 72 hours. Recent Labs     20   INR 1.8       RADIOLOGY    Xr Shoulder Right (min 2 Views)    Result Date: 2020  Patient MRN:  [id number] : 1960 Age: 62 years Gender: Male Order Date:  2020 9:00 AM EXAM: XR SHOULDER RIGHT (MIN 2 VIEWS), XR SHOULDER LEFT (MIN 2 VIEWS) NUMBER OF IMAGES:  4 INDICATION: Acute fall with bilateral shoulder pain. Trauma COMPARISON: None TECHNIQUE: 2 view right shoulder and two-view left shoulder. FINDINGS: No acute fracture or dislocation. Soft tissues unremarkable.  Bones normally mineralized. No significant abnormal findings at either shoulder. Xr Hip Bilateral W Ap Pelvis (2 Views)    Result Date: 2020  Patient MRN:  [id number] : 1960 Age: 62 years Gender: Male Order Date:  2020 9:15 AM EXAM: XR HIP BILATERAL W AP PELVIS (2 VIEWS), XR FEMUR RIGHT (MIN 2 VIEWS), XR FEMUR LEFT (MIN 2 VIEWS) NUMBER OF IMAGES:  7 INDICATION: Acute fall with pelvic, bilateral hips and bilateral femur pain. Trauma COMPARISON: Pelvis dated 2020 TECHNIQUE: Bilateral hips and pelvis 3 views, right femur 2 views and left femur 2 views FINDINGS: No acute fracture or dislocation. Bones are normally mineralized. Hip joint spaces are relatively preserved. There are unremarkable soft tissues. Unremarkable pelvis, bilateral hips and bilateral femora. Xr Femur Left (min 2 Views)    Result Date: 2020  Patient MRN:  [id number] : 1960 Age: 62 years Gender: Male Order Date:  2020 9:15 AM EXAM: XR HIP BILATERAL W AP PELVIS (2 VIEWS), XR FEMUR RIGHT (MIN 2 VIEWS), XR FEMUR LEFT (MIN 2 VIEWS) NUMBER OF IMAGES:  7 INDICATION: Acute fall with pelvic, bilateral hips and bilateral femur pain. Trauma COMPARISON: Pelvis dated 2020 TECHNIQUE: Bilateral hips and pelvis 3 views, right femur 2 views and left femur 2 views FINDINGS: No acute fracture or dislocation. Bones are normally mineralized. Hip joint spaces are relatively preserved. There are unremarkable soft tissues. Unremarkable pelvis, bilateral hips and bilateral femora. Xr Femur Right (min 2 Views)    Result Date: 2020  Patient MRN:  [id number] : 1960 Age: 62 years Gender: Male Order Date:  2020 9:15 AM EXAM: XR HIP BILATERAL W AP PELVIS (2 VIEWS), XR FEMUR RIGHT (MIN 2 VIEWS), XR FEMUR LEFT (MIN 2 VIEWS) NUMBER OF IMAGES:  7 INDICATION: Acute fall with pelvic, bilateral hips and bilateral femur pain.  Trauma COMPARISON: Pelvis dated 2020 TECHNIQUE: Bilateral hips and pelvis 3 views, right femur 2 views and left femur 2 views FINDINGS: No acute fracture or dislocation. Bones are normally mineralized. Hip joint spaces are relatively preserved. There are unremarkable soft tissues. Unremarkable pelvis, bilateral hips and bilateral femora. Ct Head Wo Contrast    Result Date: 2020  Patient MRN:  [id number] : 1960 Age: 62 years Gender: Male Order Date:  2020 9:00 AM EXAM: CT HEAD WO CONTRAST, CT CERVICAL SPINE WO CONTRAST Technique: Low-dose CT  acquisition technique included one of following options; 1 . Automated exposure control, 2. Adjustment of MA and or KV according to patient's size. 3. Use of interactive reconstruction. Dosage: 1822 mGy-cm. INDICATION:  Trauma Trauma COMPARISON: 2020 FINDINGS:  There is moderate chronic ischemic/degenerative change in the white matter for patient's age. There is some mild to moderate generalized atrophy. There is no mass effect edema or hemorrhage. There is complete opacification of the left maxillary sinus and considerable opacification of the left side of the sphenoid sinus. There is a small amount mucosal thickening left ethmoid air cells. No skull fractures are noted. There is slight anterior subluxation of C3 with respect to  C4 felt to be degenerative. There is slight anterior subluxation of C4 with respect to C5 felt to be degenerative. There is significant disc space narrowing at C5-6 and C6-7. There is focal kyphosis in this region. There are significant anterior spurs. There is slight retrolisthesis of C5 with respect to C6 felt to be degenerative. There is mild chronic compression deformity of the caudal endplate of C5 and superior endplate of C6. There are no fractures. At C2-3 there is right-sided facet arthrosis of a moderate degree. There are posterior spurs. Some right-sided facet arthrosis C3 -4 with mild stenosis and right foraminal narrowing. At C4-5 there is significant right-sided facet arthrosis.  There is right lateral spurs. Mild stenosis and right foraminal narrowing. At C5-6 diffuse posterior spurs causes moderate to moderately severe stenosis. At C6-7 small posterior spurs cause mild stenosis    Mild to moderate generalized atrophy and moderate chronic ischemic/degenerative changes in the white matter for the patient's age. There is no acute process. Significant degenerative changes in the cervical spine most pronounced C5-6. There is moderately severe stenosis at this level. There are no acute fractures    Ct Cervical Spine Wo Contrast    Result Date: 2020  Patient MRN:  [id number] : 1960 Age: 62 years Gender: Male Order Date:  2020 9:00 AM EXAM: CT HEAD WO CONTRAST, CT CERVICAL SPINE WO CONTRAST Technique: Low-dose CT  acquisition technique included one of following options; 1 . Automated exposure control, 2. Adjustment of MA and or KV according to patient's size. 3. Use of interactive reconstruction. Dosage: 1822 mGy-cm. INDICATION:  Trauma Trauma COMPARISON: 2020 FINDINGS:  There is moderate chronic ischemic/degenerative change in the white matter for patient's age. There is some mild to moderate generalized atrophy. There is no mass effect edema or hemorrhage. There is complete opacification of the left maxillary sinus and considerable opacification of the left side of the sphenoid sinus. There is a small amount mucosal thickening left ethmoid air cells. No skull fractures are noted. There is slight anterior subluxation of C3 with respect to  C4 felt to be degenerative. There is slight anterior subluxation of C4 with respect to C5 felt to be degenerative. There is significant disc space narrowing at C5-6 and C6-7. There is focal kyphosis in this region. There are significant anterior spurs. There is slight retrolisthesis of C5 with respect to C6 felt to be degenerative. There is mild chronic compression deformity of the caudal endplate of C5 and superior endplate of C6.  There are no fractures. At C2-3 there is right-sided facet arthrosis of a moderate degree. There are posterior spurs. Some right-sided facet arthrosis C3 -4 with mild stenosis and right foraminal narrowing. At C4-5 there is significant right-sided facet arthrosis. There is right lateral spurs. Mild stenosis and right foraminal narrowing. At C5-6 diffuse posterior spurs causes moderate to moderately severe stenosis. At C6-7 small posterior spurs cause mild stenosis    Mild to moderate generalized atrophy and moderate chronic ischemic/degenerative changes in the white matter for the patient's age. There is no acute process. Significant degenerative changes in the cervical spine most pronounced C5-6. There is moderately severe stenosis at this level. There are no acute fractures    Xr Shoulder Left (min 2 Views)    Result Date: 2020  Patient MRN:  [id number] : 1960 Age: 62 years Gender: Male Order Date:  2020 9:00 AM EXAM: XR SHOULDER RIGHT (MIN 2 VIEWS), XR SHOULDER LEFT (MIN 2 VIEWS) NUMBER OF IMAGES:  4 INDICATION: Acute fall with bilateral shoulder pain. Trauma COMPARISON: None TECHNIQUE: 2 view right shoulder and two-view left shoulder. FINDINGS: No acute fracture or dislocation. Soft tissues unremarkable. Bones normally mineralized. No significant abnormal findings at either shoulder. Xr Chest Portable    Result Date: 2020  Patient MRN: [id number] : 1960 Age:  62 years Gender: Male Order Date: 2020 9:00 AM Exam: XR CHEST PORTABLE Number of Images: 1 view Indication:  Acute fall with chest Trauma Comparison: 2020 FINDINGS: Indwelling T-spine neurostimulator. Heart and pulmonary vascularity normal. Lungs clear. Costophrenic angles sharp. Normal aorta. No acute cardiopulmonary findings.      Us Abdomen Limited    Result Date: 2020  Patient MRN:  [id number] : 1960 Age: 62 years Gender: Male Order Date:  2020 2:03 PM EXAM: US ABDOMEN LIMITED NUMBER OF IMAGES:  32 INDICATION:  suspected cirrhosis - ascites? ?? Suspected cirrhosis - ascites? ?? COMPARISON: None TECHNIQUE: Ulysses Mahin scale as well as color and spectral duplex sonography of the abdomen was performed. FINDINGS:        There is a moderate amount of free fluid in the abdomen which involves the right upper quadrant, right lower quadrant, and left upper quadrant. A complex cystic and solid mass is identified posterior to the spleen which measures 8.5 x 7.5 x 5.8 cm. 1. Moderate amount of free fluid in the abdomen. 2. Complex cystic and solid mass posterior to the spleen. Recommend contrast-enhanced CT of the abdomen and pelvis for further evaluation if not previously performed. Procedure - Nasal Cautery  The left side of the patient was found to have prominent septal vessels in the Anterior portion of the septum. The nose was anesthetized with a mixture of lidocaine 4% and afrin nasal spray sprayed 1 times in the left nostril(s). The irritated area was then cauterized with a silver nitrate stick until a white frost covered the affected area and no bleeding was seen.   The nose was packed with stammberger foam    Assessment/Plan:  1. 61 y/o M with epistaxis    - cautery to left septum performed at bedside, patient tolerated well  - dissolvable nasal packing placed in both nares, may need cautery to the right septum in the future but will avoid opposing cautery of the septum due to risk of perforation  - nasal saline rinses to the nose twice daily  - some bloody drainage is to be expected as the packing dissolves over the next week  - no nose blowing, strenuous activity or manipulation of the nose  - follow up with attending in 1-2 weeks    Will discuss with attending    Electronically signed by Blue Aleman DO on 8/5/20 at 4:52 PM EDT

## 2020-08-05 NOTE — PROGRESS NOTES
[facility]  Internal Medicine Residency Program  Progress Note - House Team 2    Patient:  Nkechi Howard 62 y.o. male MRN: [id number]     Date of Service: 8/5/2020     CC: Fall, altered mental status  Overnight events: None  Hospital Day: 6    Subjective     Patient was awake and resting comfortably in bed this morning. He reports persistent left-sided epistaxis ever since having a tube placed in his left nares 5-6 days ago. Has no complaints otherwise and was focused on going home over returning to [facility]. Denies HA, CP, SOB, abd pain, and n/v. Has been eating and drinking well. Objective     Physical Exam:  Vitals: BP (!) 120/58   Pulse 95   Temp 98.5 °F (36.9 °C) (Temporal)   Resp 18   Ht 6' 2\" (1.88 m)   Wt 151 lb (68.5 kg)   SpO2 96%   BMI 19.39 kg/m²     I & O - 24hr:     Intake/Output Summary (Last 24 hours) at 8/5/2020 0726  Last data filed at 8/4/2020 2122  Gross per 24 hour   Intake --   Output 400 ml   Net -400 ml     · General Appearance: Frail-appearing. Awake and alert. Oriented to person, place, and time. · HEENT:  Head: Normal, normocephalic, atraumatic. Small amount of bleeding from the left nares. · Neck: no JVD, supple, symmetrical, trachea midline and thyroid not enlarged, symmetric, no tenderness/mass/nodules  · Lung: clear to auscultation bilaterally  · Heart: regular rate and rhythm, 2/6 systolic ejection murmur LUSB>RUSB, S1, S2 normal, no click, rub or gallop  · Abdomen: soft, non-tender; bowel sounds normal; no masses,  no organomegaly  · Extremities:  extremities normal, atraumatic, no cyanosis or edema. Left foot drop noted, at baseline. · Neurologic: Alert and oriented to person, place, and time. Answering questions and following commands. No asterixis with arms and wrists extended.     Pertinent Labs & Imaging Studies     CBC:   Lab Results   Component Value Date    WBC 6.3 08/05/2020    RBC 2.30 08/05/2020    HGB 7.4 08/05/2020    HCT 22.2 08/05/2020 MCV 96.5 08/05/2020    MCH 32.2 08/05/2020    MCHC 33.3 08/05/2020    RDW 21.9 08/05/2020    PLT 70 08/05/2020    MPV 11.2 08/05/2020     BMP:    Lab Results   Component Value Date     08/05/2020    K 5.0 08/05/2020    K 5.2 07/31/2020     08/05/2020    CO2 21 08/05/2020    BUN 35 08/05/2020    LABALBU 2.9 08/05/2020    LABALBU 2.4 01/07/2020    CREATININE 1.5 08/05/2020    CALCIUM 10.2 08/05/2020    GFRAA 58 08/05/2020    LABGLOM 48 08/05/2020    LABGLOM 24 01/07/2020    GLUCOSE 79 08/05/2020    GLUCOSE 216 01/07/2020     Hepatic Function Panel:    Lab Results   Component Value Date    ALKPHOS 187 08/05/2020    ALT 48 08/05/2020    AST 62 08/05/2020    PROT 6.8 08/05/2020    BILITOT 3.6 08/05/2020    BILIDIR 1.1 08/05/2020    IBILI 2.5 08/05/2020    LABALBU 2.9 08/05/2020    LABALBU 2.4 01/07/2020     Albumin:    Lab Results   Component Value Date    LABALBU 2.9 08/05/2020    LABALBU 2.4 01/07/2020     Magnesium:    Lab Results   Component Value Date    MG 1.9 08/05/2020     PT/INR:    Lab Results   Component Value Date    PROTIME 20.8 08/03/2020    INR 1.8 08/03/2020     Last 3 Troponin:    Lab Results   Component Value Date    TROPONINI 0.02 07/31/2020     U/A:    Lab Results   Component Value Date    COLORU Yellow 07/31/2020    PROTEINU Negative 07/31/2020    PHUR 6.0 07/31/2020    WBCUA NONE 07/31/2020    RBCUA 1-3 07/31/2020    BACTERIA RARE 07/31/2020    CLARITYU Clear 07/31/2020    SPECGRAV 1.015 07/31/2020    LEUKOCYTESUR Negative 07/31/2020    UROBILINOGEN 0.2 07/31/2020    BILIRUBINUR Negative 07/31/2020    BLOODU TRACE-INTACT 07/31/2020    GLUCOSEU Negative 07/31/2020 7/31/2020 09:21 8/1/2020 21:32 8/3/2020 09:10 8/4/2020 11:26   Ammonia 117.0 (H) 46.0 97.0 (H) 31.0     Resident's Assessment and Plan     Sheliabritnierika Ramirez is a 61 y/o male with a PMHx of cirrhosis, EtOH abuse, TIA, CKD , HTN, HLD, DM and anemia who presents with a fall and AMS.  Per the wife, he has normal mental status at baseline. She last spoke with him over the phone 1 day PTA. Was unresponsive and not following commands on presentation. Ammonia was found to be elevated. He was started on Lactulose and Rifaximin with improvement of his neurological status. Abdominal US revealed a moderate amount of free fluid and a complex mass posterior to the spleen. CT A/P showed advanced liver cirrhosis and moderate ascites and no mass in the LUQ; US finding was the fundus of the stomach. 1. Acute encephalopathy, improved  2/2 hepatic encephalopathy vs infection (SBP) vs trauma vs CVA vs substance intoxication/withdrawal. Likely hepatic encephalopathy in the setting of cirrhosis, elevated ammonia, and positive asterixis. Has clinical improvement with Lactulose administration. Abd US showed moderate amount of free fluid in the abdomen. Less likely SBP no fever or elevated WBC. Less likely CVA with no acute findings on CTH and no focal deficits. Less likely due to substance with negative urine and serum EtOH. Blood cultures no growth 24 hr x2. Hyperammonemia resolved, 117->46->97->31, mentation remains normal.  · Lactulose PO  · Rifaximin 200 mg TID  · Cipro 400 mg IV Q12H for SBP prophylaxis  · Monitor LFT's     2. LAUOR on CKD  Cr 1.2 in January 2020. Unclear what stage of CKD, GFR >60 in January and >60 currently. Cr improved 1.8->1.5 today, after administration of IV contrast on 8/2. Patient says he had good PO fluid intake, trend Cr.  · Daily BMP  · Monitor UOP     3. Persistent epistaxis  Likely 2/2 trauma from tube placement. Only reports bleeding from left naris where tube was placed. Hgb stable at 7.4. Platelets 70. · C/S ENT for eval and possible cauterization/treatment    4. Hyperkalemia, resolved  Potassium 5.4->5.0. Likely 2/2 contrast-induced LAURO. Creatinine improved as well. · Daily BMP    5. Fall  Fall from bed. CTH and CT C-spine showed no acute abnormality. X-rays showed no acute injury.  Less likely to be intracranial hemorrhage. 6. Hypercalcemia  Calcium 10.5 on presentation. Ionized calcium elevated to 1.39. Today, corrected calcium 11.1. Possibly 2/2 hyperparathyroidism vs malignancy.     7. Chronic normocytic anemia  Wife reports that recent EGD, colonoscopy, and pill endoscopy did not identify bleeding source. Bone marrow biopsy 7/13/2020: Normocellular marrow with trilineage hematopoiesis, no increased blasts. Retics high normal 1.8%. Low haptoglobin. Normal LDH. Blood smear shows thrombocytopenia with schistocytes, possibly TTP, HUS, and DIC. Fibrinogen low 205, aPTT elevated 44.0, slightly elevated D-dimer 600. Consider [id number].     8. Elevated ferritin  Ferritin 1,996 in February 2020. Ferritin 5,839, iron 160, TIBC 155, and iron sat 103%. Hx of blood transfusions every few weeks since January. Most likely 2/2 multiple transfusions. No chelation therapy at this time. 9. Hx of cirrhosis  2/2 EtOH. Elevated ammonia. Abd US with moderate amount of free fluid. No fever or elevated WBC. No abdominal tenderness or peritoneal signs on exam.  · Antibiotics as above    10. Hx of DM  Glucose poorly controlled in the 300's. Per med hx on Lantus 15u QHS and 30u QAM and Glipizide 2.5 mg QAM. Now on Lantus 20u in AM and Lantus 15u QHS and HDSS Q4H.     11. Hx of HTN  -110/58. On Lopressor 25 mg BID at home. Hold home meds for now. PT/OT evaluation: [facility]: PT 16/24, OT 16/24  DVT prophylaxis/GI prophylaxis:  Disposition: Continue current care. Likely discharge to [facility] tomorrow.     George Torres MD,  PGY-1  Attending Physician: Dr. Khadra Ramirez

## 2020-08-06 LAB
ACANTHOCYTES: ABNORMAL
ALBUMIN SERPL-MCNC: 2.6 G/DL (ref 3.5–5.2)
ALP BLD-CCNC: 176 U/L (ref 40–129)
ALT SERPL-CCNC: 47 U/L (ref 0–40)
ANION GAP SERPL CALCULATED.3IONS-SCNC: 12 MMOL/L (ref 7–16)
ANISOCYTOSIS: ABNORMAL
AST SERPL-CCNC: 68 U/L (ref 0–39)
BASOPHILS ABSOLUTE: 0.03 E9/L (ref 0–0.2)
BASOPHILS RELATIVE PERCENT: 0.6 % (ref 0–2)
BILIRUB SERPL-MCNC: 2.8 MG/DL (ref 0–1.2)
BILIRUBIN DIRECT: 1 MG/DL (ref 0–0.3)
BILIRUBIN, INDIRECT: 1.8 MG/DL (ref 0–1)
BLOOD BANK DISPENSE STATUS: NORMAL
BLOOD BANK DISPENSE STATUS: NORMAL
BLOOD BANK PRODUCT CODE: NORMAL
BLOOD BANK PRODUCT CODE: NORMAL
BPU ID: NORMAL
BPU ID: NORMAL
BUN BLDV-MCNC: 36 MG/DL (ref 6–20)
BURR CELLS: ABNORMAL
CALCIUM SERPL-MCNC: 9.6 MG/DL (ref 8.6–10.2)
CHLORIDE BLD-SCNC: 102 MMOL/L (ref 98–107)
CO2: 21 MMOL/L (ref 22–29)
CREAT SERPL-MCNC: 1.5 MG/DL (ref 0.7–1.2)
DESCRIPTION BLOOD BANK: NORMAL
DESCRIPTION BLOOD BANK: NORMAL
EOSINOPHILS ABSOLUTE: 0.24 E9/L (ref 0.05–0.5)
EOSINOPHILS RELATIVE PERCENT: 5.1 % (ref 0–6)
GFR AFRICAN AMERICAN: 58
GFR NON-AFRICAN AMERICAN: 48 ML/MIN/1.73
GLUCOSE BLD-MCNC: 127 MG/DL (ref 74–99)
HCT VFR BLD CALC: 19.7 % (ref 37–54)
HCT VFR BLD CALC: 26.5 % (ref 37–54)
HEMOGLOBIN: 6.6 G/DL (ref 12.5–16.5)
HEMOGLOBIN: 8.9 G/DL (ref 12.5–16.5)
IMMATURE GRANULOCYTES #: 0.01 E9/L
IMMATURE GRANULOCYTES %: 0.2 % (ref 0–5)
LYMPHOCYTES ABSOLUTE: 1.55 E9/L (ref 1.5–4)
LYMPHOCYTES RELATIVE PERCENT: 32.6 % (ref 20–42)
MAGNESIUM: 1.7 MG/DL (ref 1.6–2.6)
MCH RBC QN AUTO: 32.4 PG (ref 26–35)
MCHC RBC AUTO-ENTMCNC: 33.5 % (ref 32–34.5)
MCV RBC AUTO: 96.6 FL (ref 80–99.9)
METER GLUCOSE: 145 MG/DL (ref 74–99)
METER GLUCOSE: 157 MG/DL (ref 74–99)
METER GLUCOSE: 186 MG/DL (ref 74–99)
METER GLUCOSE: 201 MG/DL (ref 74–99)
METER GLUCOSE: 236 MG/DL (ref 74–99)
METER GLUCOSE: 266 MG/DL (ref 74–99)
MONOCYTES ABSOLUTE: 0.71 E9/L (ref 0.1–0.95)
MONOCYTES RELATIVE PERCENT: 14.9 % (ref 2–12)
NEUTROPHILS ABSOLUTE: 2.21 E9/L (ref 1.8–7.3)
NEUTROPHILS RELATIVE PERCENT: 46.6 % (ref 43–80)
OVALOCYTES: ABNORMAL
PDW BLD-RTO: 22.4 FL (ref 11.5–15)
PHOSPHORUS: 4.5 MG/DL (ref 2.5–4.5)
PLATELET # BLD: 82 E9/L (ref 130–450)
PLATELET CONFIRMATION: NORMAL
PMV BLD AUTO: 11.5 FL (ref 7–12)
POIKILOCYTES: ABNORMAL
POLYCHROMASIA: ABNORMAL
POTASSIUM SERPL-SCNC: 4.9 MMOL/L (ref 3.5–5)
RBC # BLD: 2.04 E12/L (ref 3.8–5.8)
SCHISTOCYTES: ABNORMAL
SODIUM BLD-SCNC: 135 MMOL/L (ref 132–146)
TOTAL PROTEIN: 6.3 G/DL (ref 6.4–8.3)
WBC # BLD: 4.8 E9/L (ref 4.5–11.5)

## 2020-08-06 PROCEDURE — 36415 COLL VENOUS BLD VENIPUNCTURE: CPT

## 2020-08-06 PROCEDURE — 99232 SBSQ HOSP IP/OBS MODERATE 35: CPT | Performed by: INTERNAL MEDICINE

## 2020-08-06 PROCEDURE — 80048 BASIC METABOLIC PNL TOTAL CA: CPT

## 2020-08-06 PROCEDURE — 80076 HEPATIC FUNCTION PANEL: CPT

## 2020-08-06 PROCEDURE — 82962 GLUCOSE BLOOD TEST: CPT

## 2020-08-06 PROCEDURE — 6370000000 HC RX 637 (ALT 250 FOR IP): Performed by: STUDENT IN AN ORGANIZED HEALTH CARE EDUCATION/TRAINING PROGRAM

## 2020-08-06 PROCEDURE — 36430 TRANSFUSION BLD/BLD COMPNT: CPT

## 2020-08-06 PROCEDURE — 97530 THERAPEUTIC ACTIVITIES: CPT | Performed by: PHYSICAL THERAPIST

## 2020-08-06 PROCEDURE — 2580000003 HC RX 258: Performed by: NURSE PRACTITIONER

## 2020-08-06 PROCEDURE — 6360000002 HC RX W HCPCS: Performed by: INTERNAL MEDICINE

## 2020-08-06 PROCEDURE — 99233 SBSQ HOSP IP/OBS HIGH 50: CPT | Performed by: INTERNAL MEDICINE

## 2020-08-06 PROCEDURE — 2580000003 HC RX 258: Performed by: INTERNAL MEDICINE

## 2020-08-06 PROCEDURE — 6370000000 HC RX 637 (ALT 250 FOR IP): Performed by: INTERNAL MEDICINE

## 2020-08-06 PROCEDURE — 2060000000 HC ICU INTERMEDIATE R&B

## 2020-08-06 PROCEDURE — P9016 RBC LEUKOCYTES REDUCED: HCPCS

## 2020-08-06 PROCEDURE — 83735 ASSAY OF MAGNESIUM: CPT

## 2020-08-06 PROCEDURE — 2580000003 HC RX 258: Performed by: STUDENT IN AN ORGANIZED HEALTH CARE EDUCATION/TRAINING PROGRAM

## 2020-08-06 PROCEDURE — 84100 ASSAY OF PHOSPHORUS: CPT

## 2020-08-06 PROCEDURE — 97535 SELF CARE MNGMENT TRAINING: CPT

## 2020-08-06 PROCEDURE — 85014 HEMATOCRIT: CPT

## 2020-08-06 PROCEDURE — 85018 HEMOGLOBIN: CPT

## 2020-08-06 PROCEDURE — 85025 COMPLETE CBC W/AUTO DIFF WBC: CPT

## 2020-08-06 PROCEDURE — 99222 1ST HOSP IP/OBS MODERATE 55: CPT | Performed by: OTOLARYNGOLOGY

## 2020-08-06 RX ORDER — MAGNESIUM SULFATE IN WATER 40 MG/ML
2 INJECTION, SOLUTION INTRAVENOUS ONCE
Status: COMPLETED | OUTPATIENT
Start: 2020-08-06 | End: 2020-08-06

## 2020-08-06 RX ORDER — 0.9 % SODIUM CHLORIDE 0.9 %
20 INTRAVENOUS SOLUTION INTRAVENOUS ONCE
Status: COMPLETED | OUTPATIENT
Start: 2020-08-06 | End: 2020-08-06

## 2020-08-06 RX ADMIN — INSULIN GLARGINE 20 UNITS: 100 INJECTION, SOLUTION SUBCUTANEOUS at 08:32

## 2020-08-06 RX ADMIN — LACTULOSE 20 G: 20 SOLUTION ORAL at 21:28

## 2020-08-06 RX ADMIN — SODIUM CHLORIDE, PRESERVATIVE FREE 10 ML: 5 INJECTION INTRAVENOUS at 21:28

## 2020-08-06 RX ADMIN — LACTULOSE 20 G: 20 SOLUTION ORAL at 13:36

## 2020-08-06 RX ADMIN — MAGNESIUM SULFATE HEPTAHYDRATE 2 G: 40 INJECTION, SOLUTION INTRAVENOUS at 09:56

## 2020-08-06 RX ADMIN — INSULIN LISPRO 3 UNITS: 100 INJECTION, SOLUTION INTRAVENOUS; SUBCUTANEOUS at 08:33

## 2020-08-06 RX ADMIN — INSULIN LISPRO 3 UNITS: 100 INJECTION, SOLUTION INTRAVENOUS; SUBCUTANEOUS at 05:02

## 2020-08-06 RX ADMIN — RIFAXIMIN 200 MG: 200 TABLET ORAL at 08:33

## 2020-08-06 RX ADMIN — INSULIN GLARGINE 15 UNITS: 100 INJECTION, SOLUTION SUBCUTANEOUS at 21:28

## 2020-08-06 RX ADMIN — SODIUM CHLORIDE, PRESERVATIVE FREE 10 ML: 5 INJECTION INTRAVENOUS at 08:33

## 2020-08-06 RX ADMIN — SODIUM CHLORIDE 20 ML: 9 INJECTION, SOLUTION INTRAVENOUS at 14:47

## 2020-08-06 RX ADMIN — INSULIN LISPRO 9 UNITS: 100 INJECTION, SOLUTION INTRAVENOUS; SUBCUTANEOUS at 13:18

## 2020-08-06 RX ADMIN — LACTULOSE 20 G: 20 SOLUTION ORAL at 08:33

## 2020-08-06 RX ADMIN — SODIUM CHLORIDE 20 ML: 9 INJECTION, SOLUTION INTRAVENOUS at 10:00

## 2020-08-06 RX ADMIN — CIPROFLOXACIN 400 MG: 2 INJECTION, SOLUTION INTRAVENOUS at 21:28

## 2020-08-06 RX ADMIN — RIFAXIMIN 200 MG: 200 TABLET ORAL at 21:28

## 2020-08-06 RX ADMIN — INSULIN LISPRO 6 UNITS: 100 INJECTION, SOLUTION INTRAVENOUS; SUBCUTANEOUS at 17:30

## 2020-08-06 RX ADMIN — INSULIN LISPRO 6 UNITS: 100 INJECTION, SOLUTION INTRAVENOUS; SUBCUTANEOUS at 21:27

## 2020-08-06 RX ADMIN — CIPROFLOXACIN 400 MG: 2 INJECTION, SOLUTION INTRAVENOUS at 08:32

## 2020-08-06 RX ADMIN — INSULIN LISPRO 3 UNITS: 100 INJECTION, SOLUTION INTRAVENOUS; SUBCUTANEOUS at 01:22

## 2020-08-06 RX ADMIN — RIFAXIMIN 200 MG: 200 TABLET ORAL at 14:30

## 2020-08-06 ASSESSMENT — PAIN SCALES - GENERAL
PAINLEVEL_OUTOF10: 0

## 2020-08-06 NOTE — PROGRESS NOTES
Physical Therapy  Facility/Department: [address] PICU  Daily Treatment Note  NAME: Luz Maria Pickard  : 1960  MRN: [id number]    Date of Service: 2020    Referring Provider:  Brandon Ureña MD      Date of Service: 8/3/2020     Evaluating PT:  Ezequiel Angulo PT, DPT. [id number]     Room #:  [facility]9/0767-V  Diagnosis:  Hepatic encephalopathy   Reason for admission:  Fall out of bed, AMS   Precautions:  Falls, L foot drop, TSM  Pertinent PMHx: HTN, HLD, DM  Procedures: none  Equipment Recommendations:  FWW     SUBJECTIVE:  Pt admitted from SNF in [facility]. States ambulating with use of [employer] or using wheelchair for longer distances.      OBJECTIVE:    Initial Evaluation  Date: 8/3 Treatment  20  Short Term/ Long Term   Goals   AM-PAC 6 Clicks      Was pt agreeable to Eval/treatment? Yes   yes     Does pt have pain? Denies pain  no c/o pain     Bed Mobility  Rolling: NT  Supine to sit: SBA  Sit to supine: SBA  Scooting: SBA  Rolling: SBA  Supine to sit: SBA  Sit to supine: SBA  Scooting: SBA towards HOB Independent    Transfers Sit to stand: SBA  Stand to sit: SBA  Stand pivot: NT  Sit to stand: Min A  Stand to sit: Min A  Stand pivot NT Independent    Ambulation    75 feet x2 with [employer] Janette     75 feet x2 with FWW with Min A >200 feet with [employer] Mod I   Stair negotiation: ascended and descended  NT  NT TBD   ROM BUE:  See OT eval   BLE:  WFL       Strength BUE:  See OT eval   BLE:  knee ext 5/5  Ankle DF 5/5   Increase by 1/3 MMT grade    Balance Sitting EOB:  SBA  Dynamic Standing:  SBA [employer]  Sitting EOB: SBA  Dynamic standing: SBA Sitting EOB:  indep  Dynamic Standing:   Mod I [employer]      Pt is A & O x 3 grossly  Sensation:  Pt denies numbness and tingling to extremities  Edema:  unremarkable    Vitals:  HR increased to 150s during ambulation, decreased to 130 seated EOB with rest    Patient education  Pt educated on role of therapy, safety with mobility, gait mechanics and appropriate use of FWW    Patient

## 2020-08-06 NOTE — PROGRESS NOTES
[facility]  Internal Medicine Residency / [address]    Attending Physician Statement  I have discussed the case, including pertinent history and exam findings with the resident and the team.  I have seen and examined the patient, reviewed meds and pertinent labs and the key elements of the encounter have been performed by me. I agree with the assessment, plan and orders as documented by the resident. ENT input appreciated. Hb 6.6 today, will transfuse 1 unit PRBC. Platelets continue to increase, 80,000. Remainder of medical problems as per resident note.       José Ortega  Internal Medicine Residency Faculty

## 2020-08-06 NOTE — PLAN OF CARE
Problem: Falls - Risk of:  Goal: Will remain free from falls  Description: Will remain free from falls  8/6/2020 0040 by Emanuel Polanco RN  Outcome: Met This Shift  8/5/2020 1549 by Pawel Amaya RN  Outcome: Met This Shift  Goal: Absence of physical injury  Description: Absence of physical injury  8/6/2020 0040 by Emanuel Polanco RN  Outcome: Met This Shift  8/5/2020 1549 by Pawel Amaya RN  Outcome: Met This Shift     Problem: Skin Integrity:  Goal: Will show no infection signs and symptoms  Description: Will show no infection signs and symptoms  8/6/2020 0040 by Emanuel Polanco RN  Outcome: Met This Shift  8/5/2020 1549 by Pawel Amaya RN  Outcome: Met This Shift  Goal: Absence of new skin breakdown  Description: Absence of new skin breakdown  8/6/2020 0040 by Emanuel Polanco RN  Outcome: Met This Shift  8/5/2020 1549 by Pawel Amaya RN  Outcome: Met This Shift     Problem: Pain:  Goal: Pain level will decrease  Description: Pain level will decrease  8/6/2020 0040 by Emanuel Polanco RN  Outcome: Met This Shift  8/5/2020 1549 by Pawel Amaya RN  Outcome: Met This Shift  Goal: Control of acute pain  Description: Control of acute pain  8/5/2020 1549 by Pawel Amaya RN  Outcome: Met This Shift  Goal: Control of chronic pain  Description: Control of chronic pain  8/5/2020 1549 by Pawel Amaya RN  Outcome: Met This Shift     Problem: Musculor/Skeletal Functional Status  Goal: Highest potential functional level  8/5/2020 1549 by Pawel Amaya RN  Outcome: Met This Shift  Goal: Absence of falls  8/5/2020 1549 by Pawel Amaya RN  Outcome: Met This Shift

## 2020-08-06 NOTE — CARE COORDINATION
Plan at discharge remains [address] [facility]. No precert needed to return. COVID completed on 7/31 (-). Ambulette form on soft chart. IV Cipro q 12 continues. Carb control diet started. CM will follow.   Eduardo Chase, RN  [facility] Case Management  [phone]

## 2020-08-06 NOTE — PROGRESS NOTES
[facility]  Internal Medicine Residency Program  Progress Note - House Team 2    Patient:  Lui Sood 62 y.o. male MRN: [id number]     Date of Service: 8/6/2020     CC: Fall, altered mental status  Overnight events: None  Hospital Day: 7    Subjective     Hemoglobin was 6.6 this AM. 2 unit of pRBC was ordered. Repeat H&H pending. Patient was sleeping but was easy to wake. He reports that he has not had any epistaxis since the cauterization and packing by ENT. He has no complaints. Denies CP, SOB, abd pain, and n/v.    Objective     Physical Exam:  Vitals: BP (!) 115/58   Pulse 92   Temp 99 °F (37.2 °C) (Temporal)   Resp 17   Ht 6' 2\" (1.88 m)   Wt 151 lb (68.5 kg)   SpO2 96%   BMI 19.39 kg/m²     I & O - 24hr:     Intake/Output Summary (Last 24 hours) at 8/6/2020 1449  Last data filed at 8/6/2020 1411  Gross per 24 hour   Intake 1048.33 ml   Output 350 ml   Net 698.33 ml     · General Appearance: Frail-appearing. Sleeping but arousable. Alert. Oriented to person, place, and time. · HEENT:  Head: Normal, normocephalic, atraumatic. No epistaxis noted. · Neck: no JVD, supple, symmetrical, trachea midline and thyroid not enlarged, symmetric, no tenderness/mass/nodules  · Lung: clear to auscultation bilaterally  · Heart: regular rate and rhythm, 2/6 systolic ejection murmur LUSB>RUSB, S1, S2 normal, no click, rub or gallop  · Abdomen: Abdomen distended. Non-tender to palpation. No guarding or rebound. Active bowel sounds. · Extremities:  extremities normal, atraumatic, no cyanosis or edema. Left foot drop noted, at baseline. · Neurologic: Alert and oriented to person, place, and time. Answering questions and following commands. No asterixis with arms and wrists extended.     Pertinent Labs & Imaging Studies     CBC:   Lab Results   Component Value Date    WBC 4.8 08/06/2020    RBC 2.04 08/06/2020    HGB 6.6 08/06/2020    HCT 19.7 08/06/2020    MCV 96.6 08/06/2020    MCH 32.4 08/06/2020    MCHC 33.5 08/06/2020    RDW 22.4 08/06/2020    PLT 82 08/06/2020    MPV 11.5 08/06/2020     BMP:    Lab Results   Component Value Date     08/06/2020    K 4.9 08/06/2020    K 5.2 07/31/2020     08/06/2020    CO2 21 08/06/2020    BUN 36 08/06/2020    LABALBU 2.6 08/06/2020    LABALBU 2.4 01/07/2020    CREATININE 1.5 08/06/2020    CALCIUM 9.6 08/06/2020    GFRAA 58 08/06/2020    LABGLOM 48 08/06/2020    LABGLOM 24 01/07/2020    GLUCOSE 127 08/06/2020    GLUCOSE 216 01/07/2020     Hepatic Function Panel:    Lab Results   Component Value Date    ALKPHOS 176 08/06/2020    ALT 47 08/06/2020    AST 68 08/06/2020    PROT 6.3 08/06/2020    BILITOT 2.8 08/06/2020    BILIDIR 1.0 08/06/2020    IBILI 1.8 08/06/2020    LABALBU 2.6 08/06/2020    LABALBU 2.4 01/07/2020     Albumin:    Lab Results   Component Value Date    LABALBU 2.6 08/06/2020    LABALBU 2.4 01/07/2020     Magnesium:    Lab Results   Component Value Date    MG 1.7 08/06/2020     PT/INR:    Lab Results   Component Value Date    PROTIME 20.8 08/03/2020    INR 1.8 08/03/2020     Last 3 Troponin:    Lab Results   Component Value Date    TROPONINI 0.02 07/31/2020     U/A:    Lab Results   Component Value Date    COLORU Yellow 07/31/2020    PROTEINU Negative 07/31/2020    PHUR 6.0 07/31/2020    WBCUA NONE 07/31/2020    RBCUA 1-3 07/31/2020    BACTERIA RARE 07/31/2020    CLARITYU Clear 07/31/2020    SPECGRAV 1.015 07/31/2020    LEUKOCYTESUR Negative 07/31/2020    UROBILINOGEN 0.2 07/31/2020    BILIRUBINUR Negative 07/31/2020    BLOODU TRACE-INTACT 07/31/2020    GLUCOSEU Negative 07/31/2020 7/31/2020 09:21 8/1/2020 21:32 8/3/2020 09:10 8/4/2020 11:26   Ammonia 117.0 (H) 46.0 97.0 (H) 31.0     Resident's Assessment and Plan     Enoch Bradley is a 61 y/o male with a PMHx of cirrhosis, EtOH abuse, TIA, CKD , HTN, HLD, DM and anemia who presents with a fall and AMS. Per the wife, he has normal mental status at baseline.  She last spoke with him

## 2020-08-06 NOTE — PROGRESS NOTES
Inpatient Hematology/Oncology Progress Note    Subjective:  No fever. Fatigue    Objective:  /69   Pulse 99   Temp 99.1 °F (37.3 °C) (Temporal)   Resp 17   Ht 6' 2\" (1.88 m)   Wt 151 lb (68.5 kg)   SpO2 100%   BMI 19.39 kg/m²   GENERAL: Alert, oriented x 3, not in acute distress. HEENT: PERRLA; EOMI. Oropharynx clear. NECK: Supple. Without lymphadenopathy. LUNGS: Diminished bilaterally. No wheezing, crackles or ronchi. CARDIOVASCULAR: Regular rate. No murmurs, rubs or gallops. ABDOMEN: Soft. Non-tender, non-distended. Positive bowel sounds. EXTREMITIES: Without clubbing, cyanosis, or edema. NEUROLOGIC: No focal deficits. Diagnostics:  Lab Results   Component Value Date    WBC 4.8 08/06/2020    HGB 6.6 (L) 08/06/2020    HCT 19.7 (L) 08/06/2020    MCV 96.6 08/06/2020    PLT 82 (L) 08/06/2020     Lab Results   Component Value Date     08/06/2020    K 4.9 08/06/2020     08/06/2020    CO2 21 (L) 08/06/2020    BUN 36 (H) 08/06/2020    CREATININE 1.5 (H) 08/06/2020    GLUCOSE 127 (H) 08/06/2020    CALCIUM 9.6 08/06/2020    PROT 6.3 (L) 08/06/2020    LABALBU 2.6 (L) 08/06/2020    BILITOT 2.8 (H) 08/06/2020    ALKPHOS 176 (H) 08/06/2020    AST 68 (H) 08/06/2020    ALT 47 (H) 08/06/2020    LABGLOM 48 08/06/2020    GFRAA 58 08/06/2020     Impression/Plan:  62 y.o. male with hx of anemia and thrombocytopenia, followed at [facility] Dr. Elías Harmon     Admitted to ED on 07/31/2020 with hepatic encephalopathy and AMS. COVID-19 negative.    On 07/31/2020:   Hb 8.4, Hct 25.0, MCV 89.6, WBC 5.1,   Reticulocytes 1.8%  Haptoglobin <10 ()  Peripheral Blood Smear:   Platelets are present in decreased numbers and display normal morphology.    Red blood cells are normocytic normochromic with moderate to marked anisopoikilocytosis including echinocytes, target cells and schistocytes.    White blood cells are morphologically and quantitatively unremarkable.    Immature forms or blasts are absent.      CT head 07/31/2020:   Mild to moderate generalized atrophy and moderate chronic ischemic/degenerative changes in the white matter for the patient's age. There is no acute process. Significant degenerative changes in the cervical spine most pronounced C5-6. There is moderately severe stenosis at this level. There are no acute fractures      CT cervical spine 07/31/2020:   Mild to moderate generalized atrophy and moderate chronic ischemic/degenerative changes in the white matter for the patient's age. There is no acute process. Significant degenerative changes in the cervical spine most pronounced C5-6. There is moderately severe stenosis at this level. There are no acute fractures      On 08/01/2020:   Fe 160, TIBC 155, FeSat 103%, Ferritin 5,839     Abdominal US 08/01/2020: Moderate amount of free fluid in the abdomen. Complex cystic and solid mass posterior to the spleen. Recommend contrast-enhanced CT of the abdomen and pelvis for further evaluation if not previously performed.     CT abdomen/pelvis 08/02/2020:   Advanced liver cirrhosis with the moderate ascites. No mass in the area of the left upper quadrant. Cystic structure posterior to the spleen seen in the ultrasound related with the fundal region of the stomach. Multiple gallstones . CT scan area in the medial aspect of the head of the pancreas can be additionally evaluated with multiphase MRI LAVA sequence/MRCP evaluation.      On 08/03/2020:  Hb 5.6, Hct 17.2, MCV 95.0, WBC 5.2, PLT 66  D-Dimer 600  Fibrinogen 205 (225-540)  PT/INR 20.8/1.8  APTT 44.0 (24.5-35. 1)     On 08/04/2020:  Hb 7.3, Hct 22.2, MCV 96.1, WBC 4.5, PLT 63    TEG test:  Reaction time 7.1 (5.0-10.0)  Clotting time 1.5 (1.0-3.0)  Clot Strength 68.7 (59.0-74.0)  Max Amplitude 44.6 (50.0-70.0)  G-TEG 4.0 (4.5-11.0)  EPL-TEG 4.7(0.0-15.0)  LY30 (Fibrinolysis) 4.7 (0.0-8.0)     Dr. Tejal Nam at [facility] for anemia, hypergammaglobulinemia, and thrombocytopenia. Bone marrow aspirate and biopsy done on 07/13/2020 at [facility]. NORMOCELLULAR MARROW WITH TRILINEAGE HEMATOPOIESIS  BLASTS ARE NOT SIGNIFICANTLY INCREASED  Flow cytometric immunophenotypic studies performed on the bone marrow demonstrate many maturing granulocytes, less than 1% CD34-positive blasts, approximately 5% CD14-positive monocytes, a moderate number of heterogeneous T-cells, a few natural killer cells, a few polytypic B-cells, very few CD10-positive B-cells with an immunophenotype compatible with hematogones, and very few CD38-positive presumptive plasma cells. Cytogenetics  Interpretation  46,XY[20]  Apparently normal male chromosome analysis.  No clonal numerical or structural abnormalities were observed. MYELOID NGS TEST RESULTS SUMMARY   Genomic Alterations Identified:NONE     Hb 6.6 this am; Transfused 1 unit prbc today 08/06/2020.   Will follow up with Dr. Daron Phillips at [facility] after discharge to resume care.      08/06/2020  Ronald Garrison MD

## 2020-08-06 NOTE — PROGRESS NOTES
Occupational Therapy  OT BEDSIDE TREATMENT NOTE      Date:2020  Patient Name: Aron Acuña  MRN: [id number]  : 1960  Room: [address] [place] [address]-A     Referring Physician:  Sheridan Quick MD     Evaluating OT:  ALICIA Garza, OTR/L #[id number]     AM-PAC Daily Activity Raw Score:  15/24  Recommended Adaptive Equipment: w/w; TBA     Reason for Admission:  Pt was transferred from a SNF after falling OOB     Diagnosis:  Hepatic Encephalopathy, Altered Mental Status, Closed Head Injury     Procedures this admission:  None      Pertinent Medical History:  DM, HTN, Lumbar Fusion , Implantation of Lumbar Cord Stimulator        Precautions:  Falls  TSM  Left Foot Drop  Griffin Catheter  Carb Control Diet     Pt is a Fair historian - most information obtained from Chart/previous therapy records     Home Living: Pt was transferred from a SNF.  (Unable to report LOS in days vs weeks vs months despite VCs)    Bathroom setup:  Walk- In-Shower, High Commode, Grab bars throughout  Equipment owned:  ??     Prior Level of Function:  Received assist w/ ADLs, Transfers and Mobility using [facility] for ambulation. W/C for extended distances  Driving:  No  Occupation:  None reported     Pain Level:  No c/o pain   Additional Complaints:  General weakness and bloody nose after treatment.      Cognition: A & O x 2 - generally oriented to Self, place, month, year, place and situation   Able to Follow Multi-Step Commands w/ Min-Mod VCs. Slow attention to task and slow response.                Memory:  fair               Sequencing:  fair               Problem solving:  fair               Judgement/safety:  fair   Additional Comments:  Pt was pleasant, cooperative     Functional Assessment:    Initial Eval Status  Date: 20 Treatment Status  Date: 20 Short Term/Long Term Goals  Treatment frequency: PRN 2-4 x/week  1-2 weeks   Feeding Set up     Able to feed self, assist to open containers on tray  Setup  NA   Grooming SUP/Set up     Able to complete tasks after set up while seated EOB - unable to ambulate to or stand at sink for ax d/t general weakness/anemia     SBA; Min verbal prompting to complete washing face, washing through hair and combing hair while sitting up on the EOB. Close SUP  Standing At The Sink   UB Dressing Min A/Set up     Required Min A to don/doff gown seated EOB     Min A;    Min verbal for proper positioning and Min A to properly rocky gown at the EOB. SUP   LB Dressing Mod A     Required Mod A to don socks, Min A for dynamic standing balance + Min A to don pants   SBA; Per last treatment. Min A   Bathing NT       Mod A while at the EOB. Min A   Toileting Max A     Required Max A for Bowel hygiene, use of Bedpan, Mod A for clothing adjustment     Catheter present. Incontinent of BM when transferring to the EOB. Min A   Bed Mobility  Rolling:  Min A  Repositioning:  Min A   Supine to Sit:  Min A    Sit to Supine:  Min A      Min A/VCs      Rolling: SBA  Repositioning:  SBA   Supine to Sit:  SBA    Sit to Supine:  Sup     Pt able to transfer from supine to sitting position with increased time and due to safety pt placed back into supine with HOB. Mod I   Functional Transfers Sit to stand:  Min A  Stand to sit:  Min A       From EOB 2x  Min VCs/Pt ed re: safety/hand placement    Sit to stand:  Min A  Stand to sit:  Min A ;    Min verbal prompting for proper hand placement and body alignment to improve safety while utilizing w/w to maintain balance.       SUP   Functional Mobility Min A w/ [employer]     Short distance along EOB, limited by weakness, anemia     Min A w/w;    Due to weakness and L foot drop pt requires Min A, verbal prompting to slow pace and a w/w.     SUP   Balance Sitting:      Static:  Remote SUP EOB    Dynamic:  Close SUP w/ functional ax EOB     Standing:      Static:  Min A w/ [employer]    Dynamic:  Min A w/ functional ax/mobility w/ [employer]     Sitting:  SBA    Standing: Min w/w     Activity

## 2020-08-07 VITALS
HEIGHT: 74 IN | WEIGHT: 151 LBS | SYSTOLIC BLOOD PRESSURE: 103 MMHG | OXYGEN SATURATION: 99 % | HEART RATE: 81 BPM | RESPIRATION RATE: 18 BRPM | DIASTOLIC BLOOD PRESSURE: 58 MMHG | TEMPERATURE: 97.8 F | BODY MASS INDEX: 19.38 KG/M2

## 2020-08-07 LAB
ACANTHOCYTES: ABNORMAL
ALBUMIN SERPL-MCNC: 2.8 G/DL (ref 3.5–5.2)
ALP BLD-CCNC: 184 U/L (ref 40–129)
ALT SERPL-CCNC: 48 U/L (ref 0–40)
ANION GAP SERPL CALCULATED.3IONS-SCNC: 11 MMOL/L (ref 7–16)
ANISOCYTOSIS: ABNORMAL
AST SERPL-CCNC: 72 U/L (ref 0–39)
BASOPHILS ABSOLUTE: 0 E9/L (ref 0–0.2)
BASOPHILS RELATIVE PERCENT: 1 % (ref 0–2)
BILIRUB SERPL-MCNC: 2.8 MG/DL (ref 0–1.2)
BILIRUBIN DIRECT: 1 MG/DL (ref 0–0.3)
BILIRUBIN, INDIRECT: 1.8 MG/DL (ref 0–1)
BUN BLDV-MCNC: 37 MG/DL (ref 6–20)
BURR CELLS: ABNORMAL
CALCIUM SERPL-MCNC: 9.7 MG/DL (ref 8.6–10.2)
CHLORIDE BLD-SCNC: 101 MMOL/L (ref 98–107)
CO2: 20 MMOL/L (ref 22–29)
CREAT SERPL-MCNC: 1.5 MG/DL (ref 0.7–1.2)
EOSINOPHILS ABSOLUTE: 0.32 E9/L (ref 0.05–0.5)
EOSINOPHILS RELATIVE PERCENT: 6.1 % (ref 0–6)
GFR AFRICAN AMERICAN: 58
GFR NON-AFRICAN AMERICAN: 48 ML/MIN/1.73
GLUCOSE BLD-MCNC: 67 MG/DL (ref 74–99)
HCT VFR BLD CALC: 25.8 % (ref 37–54)
HEMOGLOBIN: 8.6 G/DL (ref 12.5–16.5)
HYPOCHROMIA: ABNORMAL
LYMPHOCYTES ABSOLUTE: 1.27 E9/L (ref 1.5–4)
LYMPHOCYTES RELATIVE PERCENT: 23.7 % (ref 20–42)
MAGNESIUM: 2 MG/DL (ref 1.6–2.6)
MCH RBC QN AUTO: 30.9 PG (ref 26–35)
MCHC RBC AUTO-ENTMCNC: 33.3 % (ref 32–34.5)
MCV RBC AUTO: 92.8 FL (ref 80–99.9)
METER GLUCOSE: 175 MG/DL (ref 74–99)
METER GLUCOSE: 261 MG/DL (ref 74–99)
METER GLUCOSE: 268 MG/DL (ref 74–99)
METER GLUCOSE: 69 MG/DL (ref 74–99)
METER GLUCOSE: 73 MG/DL (ref 74–99)
MONOCYTES ABSOLUTE: 0.74 E9/L (ref 0.1–0.95)
MONOCYTES RELATIVE PERCENT: 14 % (ref 2–12)
NEUTROPHILS ABSOLUTE: 2.97 E9/L (ref 1.8–7.3)
NEUTROPHILS RELATIVE PERCENT: 56.1 % (ref 43–80)
OVALOCYTES: ABNORMAL
PDW BLD-RTO: 21.4 FL (ref 11.5–15)
PHOSPHORUS: 4.6 MG/DL (ref 2.5–4.5)
PLATELET # BLD: 84 E9/L (ref 130–450)
PLATELET CONFIRMATION: NORMAL
PMV BLD AUTO: 10.7 FL (ref 7–12)
POIKILOCYTES: ABNORMAL
POLYCHROMASIA: ABNORMAL
POTASSIUM SERPL-SCNC: 4.6 MMOL/L (ref 3.5–5)
RBC # BLD: 2.78 E12/L (ref 3.8–5.8)
SARS-COV-2, NAAT: NOT DETECTED
SCHISTOCYTES: ABNORMAL
SODIUM BLD-SCNC: 132 MMOL/L (ref 132–146)
TOTAL PROTEIN: 6.6 G/DL (ref 6.4–8.3)
WBC # BLD: 5.3 E9/L (ref 4.5–11.5)

## 2020-08-07 PROCEDURE — 99221 1ST HOSP IP/OBS SF/LOW 40: CPT | Performed by: OTOLARYNGOLOGY

## 2020-08-07 PROCEDURE — 2580000003 HC RX 258: Performed by: INTERNAL MEDICINE

## 2020-08-07 PROCEDURE — 36415 COLL VENOUS BLD VENIPUNCTURE: CPT

## 2020-08-07 PROCEDURE — 80048 BASIC METABOLIC PNL TOTAL CA: CPT

## 2020-08-07 PROCEDURE — 83735 ASSAY OF MAGNESIUM: CPT

## 2020-08-07 PROCEDURE — 6370000000 HC RX 637 (ALT 250 FOR IP): Performed by: INTERNAL MEDICINE

## 2020-08-07 PROCEDURE — 85025 COMPLETE CBC W/AUTO DIFF WBC: CPT

## 2020-08-07 PROCEDURE — 82962 GLUCOSE BLOOD TEST: CPT

## 2020-08-07 PROCEDURE — 80076 HEPATIC FUNCTION PANEL: CPT

## 2020-08-07 PROCEDURE — 99238 HOSP IP/OBS DSCHRG MGMT 30/<: CPT | Performed by: INTERNAL MEDICINE

## 2020-08-07 PROCEDURE — 84100 ASSAY OF PHOSPHORUS: CPT

## 2020-08-07 PROCEDURE — 6360000002 HC RX W HCPCS: Performed by: INTERNAL MEDICINE

## 2020-08-07 PROCEDURE — U0002 COVID-19 LAB TEST NON-CDC: HCPCS

## 2020-08-07 RX ORDER — INSULIN GLARGINE 100 [IU]/ML
15 INJECTION, SOLUTION SUBCUTANEOUS NIGHTLY
Qty: 1 VIAL | Refills: 3 | Status: SHIPPED | OUTPATIENT
Start: 2020-08-07

## 2020-08-07 RX ORDER — INSULIN GLARGINE 100 [IU]/ML
15 INJECTION, SOLUTION SUBCUTANEOUS
Status: DISCONTINUED | OUTPATIENT
Start: 2020-08-07 | End: 2020-08-07 | Stop reason: HOSPADM

## 2020-08-07 RX ORDER — INSULIN GLARGINE 100 [IU]/ML
15 INJECTION, SOLUTION SUBCUTANEOUS
Qty: 1 VIAL | Refills: 3 | Status: SHIPPED | OUTPATIENT
Start: 2020-08-08

## 2020-08-07 RX ADMIN — RIFAXIMIN 200 MG: 200 TABLET ORAL at 08:13

## 2020-08-07 RX ADMIN — INSULIN GLARGINE 15 UNITS: 100 INJECTION, SOLUTION SUBCUTANEOUS at 08:13

## 2020-08-07 RX ADMIN — CIPROFLOXACIN 400 MG: 2 INJECTION, SOLUTION INTRAVENOUS at 08:13

## 2020-08-07 RX ADMIN — INSULIN LISPRO 3 UNITS: 100 INJECTION, SOLUTION INTRAVENOUS; SUBCUTANEOUS at 12:17

## 2020-08-07 RX ADMIN — SODIUM CHLORIDE, PRESERVATIVE FREE 10 ML: 5 INJECTION INTRAVENOUS at 08:13

## 2020-08-07 RX ADMIN — INSULIN LISPRO 3 UNITS: 100 INJECTION, SOLUTION INTRAVENOUS; SUBCUTANEOUS at 17:40

## 2020-08-07 RX ADMIN — RIFAXIMIN 200 MG: 200 TABLET ORAL at 13:30

## 2020-08-07 RX ADMIN — LACTULOSE 20 G: 20 SOLUTION ORAL at 08:13

## 2020-08-07 RX ADMIN — INSULIN LISPRO 3 UNITS: 100 INJECTION, SOLUTION INTRAVENOUS; SUBCUTANEOUS at 01:14

## 2020-08-07 ASSESSMENT — PAIN SCALES - GENERAL: PAINLEVEL_OUTOF10: 0

## 2020-08-07 NOTE — DISCHARGE SUMMARY
[address] [place] [address]  Discharge Summary    PCP: Junior Robbins MD    Admit Date:7/31/2020  Discharge Date: 8/7/2020    Attending Physician: Dr. Víctor Cervantes DO    Admission Diagnosis:   1. Acute encephalopathy  2. LAURO on CKD  3. Fall  4. Hyperkalemia  5. Hypercalcemia  6. Chronic normocytic anemia  7. Hx of cirrhosis  8. Hx of IDDM  9. Hx of HTN    Discharge Diagnosis:  1. Acute hepatic encephalopathy, resolved  2. Chronic normocytic anemia, S/P 2u pRBC  3. LAURO on CKD stage III, improved  4. Persistent epistaxis, resolved  5. Elevated ferritin  6. Fall  7. Hyperkalemia, resolved  8. Hypercalcemia  9. Hx of cirrhosis  10. Hx of IDDM  11. Hx of HTN    Hospital Course:  Ignacio Mesa is a 63 y/o male with a PMHx of cirrhosis, EtOH abuse, TIA, CKD , HTN, HLD, DM and anemia who presented with a fall and AMS. Per EMS, the patient was at [facility] and rolled out of bed onto the floor and was found to have altered mental status. Wife was at the bedside and provided history. Patient had a fall and a subdural hematoma in January 2020 and has been in and out of the hospital since. He has been in [facility] for about 1 week after being discharged from a recent hospitalization. She says he has normal mental status at baseline. She talks to the patient on the phone multiple times per day and reports that yesterday he seemed normal and did not have any specific complaints. At this time the patient is unresponsive and not following commands. She says that since January his ammonia intermittently becomes elevated and he becomes slightly confused but nothing similar to this current issue. She also notes that he has had severe anemia, leukopenia, and thrombocytopenia and had a EGD, colonoscopy, and pill endoscopy with no source of bleeding identified. He had a bone marrow biopsy done 3 weeks ago. Ammonia was found to be elevated to 117.  He was started on Lactulose and Rifaximin and had significant improvement in his mental status returning to baseline as well as his ammonia levels. Ultrasound and CT abdomen revealed moderate ascites but abdominal exam was not concerning for SBP and he did not have an elevated WBC or fever during admission. Cipro was given for SBP prophylaxis. He had an LAURO on arrival and after IV contrast administration that resolved with hydration. His ferritin was found to be profoundly elevated, likely due to his several recent blood transfusions. Hemoglobin dropped to 5.6 and he was transfused 1 unit of blood with improvement to 7.3. During the transfusion he had a tube placed in his nostril and he had persistent epistaxis afterwards. ENT performed nasal cautery and he had resolution of the epistaxis. Hemoglobin again dropped to 6.6 and he was transfused 2 units of blood. Hemoglobin improved to 8.9 and remained stable. His glucose was moderately controlled during admission with one episode of hypoglycemia. His Lantus was decreased from 30u QAM and 15u QHS to 15u QAM and 15u QHs with the rest of his insulin regimen staying the same. He was discharged back to [facility] in stable condition. Significant findings (history and exam, laboratory, radiological, pathology, other tests):      7/31/2020 09:21 8/1/2020 21:32 8/3/2020 09:10 8/4/2020 11:26   Ammonia 117.0 (H) 46.0 97.0 (H) 31.0       Pending test results: None    Consults:  1. Gastroenterology  2. Hematology  3. Otolaryngology    Procedures:  1.  Nasal cautery    Condition at discharge: Stable    Disposition: SNF    Discharge Medications:  Current Discharge Medication List      START taking these medications    Details   rifaximin (XIFAXAN) 200 MG tablet Take 1 tablet by mouth 3 times daily for 10 days  Qty: 30 tablet, Refills: 0         CONTINUE these medications which have CHANGED    Details   !! insulin glargine (LANTUS) 100 UNIT/ML injection vial Inject 15 Units into the skin nightly  Qty: 1 vial, Refills: 3      !! insulin glargine (LANTUS) 100 UNIT/ML injection vial Inject 15 Units into the skin daily (with breakfast)  Qty: 1 vial, Refills: 3       !! - Potential duplicate medications found. Please discuss with provider. CONTINUE these medications which have NOT CHANGED    Details   white petrolatum OINT ointment Apply topically 2 times daily as needed      Multiple Vitamins-Minerals (CENTRUM SILVER PO) Take 1 tablet by mouth daily      glipiZIDE (GLUCOTROL) 5 MG tablet Take 2.5 mg by mouth daily In the morning for diabetes      insulin lispro (HUMALOG KWIKPEN) 200 UNIT/ML SOPN pen Inject 5 Units into the skin 3 times daily (with meals)      lactulose (CHRONULAC) 10 GM/15ML solution Take 30 mLs by mouth 4 times daily      pantoprazole (PROTONIX) 40 MG tablet Take 40 mg by mouth 2 times daily For GERD      sucralfate (CARAFATE) 1 GM tablet Take 1 g by mouth 4 times daily For gastric protection      vitamin D (ERGOCALCIFEROL) 1.25 MG (66591 UT) CAPS capsule Take 50,000 Units by mouth once a week Give one capsule by mouth in the morning every Sunday for supplement . Due 08/02/2020      insulin lispro (HUMALOG) 100 UNIT/ML injection vial Inject 1 Units into the skin 3 times daily (before meals) Inject as per sliding scale : If -175= 3 units, 176-200=4 units, 201-225= 5 units, 226-250= 6 units, 251-300= 8 units, 301-350= 10 units, 351-400=14 units. For  and greater CALL MD      folic acid (FOLVITE) 1 MG tablet Take 1 mg by mouth daily      cyanocobalamin 1000 MCG/ML injection Inject 1,000 mcg into the muscle every 14 days Every 2 weeks on Sunday. Due 08/09/2020         STOP taking these medications       furosemide (LASIX) 40 MG tablet Comments:   Reason for Stopping:         metoprolol tartrate (LOPRESSOR) 50 MG tablet Comments:   Reason for Stopping:             Activity: activity as tolerated  Diet: regular diet    Follow-up appointments:   Follow-up With  Details  Why  Contact Info   Carla Lyons MD [address] [place] [address] Balbina Kirkland MD  Call    [address] [place] [address],Third [address]  [phone]     Patient Instructions:  [facility] Internal Medicine Resident Service     Activity as tolerated  Diet: common adult  Please be compliant with your medications and take them as prescribed. Special Instructions:   1. Stop taking Lasix  2. Stop taking Metoprolol    Other than any new prescriptions given to you today, the list of home going meds on this After Visit Summary are based on information provided to us from you. This information, including the list, dose, and frequency of medications is only as accurate as the information you provided. If you have any questions or concerns about your home medications, please contact your Primary Care Physician for further clarification.       Vu Yu MD, PGY-1  1:59 PM 8/7/2020

## 2020-08-07 NOTE — DISCHARGE INSTR - COC
Continuity of Care Form    Patient Name: Tanna Haskins   :  1960  MRN:  [id number]    Admit date:  2020  Discharge date:  2020    Code Status Order: Full Code   Advance Directives:   Advance Care Flowsheet Documentation     Date/Time Healthcare Directive Type of Healthcare Directive Copy in [address] Agent's Name Healthcare Agent's Phone Number    20 1300  No, patient does not have an advance directive for healthcare treatment -- -- -- -- --          Admitting Physician:  Jessica Colby DO  PCP: Fransisca Santiago MD    Discharging Nurse: [facility] Unit/Room#: 5014/3052-F  Discharging Unit Phone Number: [phone]    Emergency Contact:   Extended Emergency Contact Information  Primary Emergency Contact: Luis Carrion  Address: [address] [place] [address] Phone: [phone]  Mobile Phone: [phone]  Relation: Spouse    Past Surgical History:  Past Surgical History:   Procedure Laterality Date    BACK SURGERY      fusion lumbar    ELBOW SURGERY      aspirated with infection     HERNIA REPAIR      OTHER SURGICAL HISTORY  6 16 16    stage 1 4 day percutaneous trial medtronic lumbar spinal cord stimulator    OTHER SURGICAL HISTORY N/A 10/10/2016    surgical implantation medtronic lumbar spinal cord stimulator electrodfe and generator       Immunization History: There is no immunization history on file for this patient.     Active Problems:  Patient Active Problem List   Diagnosis Code    Postlaminectomy syndrome M96.1    Chronic pain syndrome G89.4    Sciatica M54.30    SDH (subdural hematoma) (HCC) S06.5X9A    Hepatic encephalopathy (HCC) B68.20    Alcoholic cirrhosis of liver with ascites (HCC) K70.31    Thrombocytopenia (HCC) D69.6    Normochromic normocytic anemia D64.9    LAURO (acute kidney injury) ([facility] [address] N17.9    Elevated liver enzymes R74.8    08/02/20 2000   Dressing/Treatment Open to air 08/06/20 1919   Wound Cleansed Rinsed/Irrigated with saline 07/31/20 1300   Wound Length (cm) 1.2 cm 07/31/20 1300   Wound Width (cm) 0.6 cm 07/31/20 1300   Wound Depth (cm) 0.1 cm 07/31/20 1300   Wound Surface Area (cm^2) 0.72 cm^2 07/31/20 1300   Wound Volume (cm^3) 0.07 cm^3 07/31/20 1300   Wound Assessment Other (Comment) 08/06/20 1919   Number of days: 6       Wound 07/31/20 Buttocks Right 0.3 x 0.3 x 0.1 cm abrasion right buttocks (Active)   Wound Other 07/31/20 1300   Dressing/Treatment Open to air 08/06/20 1919   Wound Cleansed Rinsed/Irrigated with saline 07/31/20 1300   Wound Length (cm) 0.3 cm 07/31/20 1300   Wound Width (cm) 0.3 cm 07/31/20 1300   Wound Depth (cm) 0.1 cm 07/31/20 1300   Wound Surface Area (cm^2) 0.09 cm^2 07/31/20 1300   Wound Volume (cm^3) 0.01 cm^3 07/31/20 1300   Wound Assessment Dry 08/06/20 1919   Number of days: 6        Elimination:  Continence:   · Bowel: Yes  · Bladder: Yes  Urinary Catheter: None and Removal Date 8/7/2020 @ 1230 patient voided post removal without difficulity   Colostomy/Ileostomy/Ileal Conduit: No       Date of Last BM: 8/7/2020    Intake/Output Summary (Last 24 hours) at 8/7/2020 1233  Last data filed at 8/7/2020 1228  Gross per 24 hour   Intake 8331.66 ml   Output 850 ml   Net 7481.66 ml     I/O last 3 completed shifts: In: 658.3 [I.V.:10; Blood:648.3]  Out: 550 [Urine:550]    Safety Concerns:     History of Falls (last 30 days) and At Risk for Falls    Impairments/Disabilities:      None    Nutrition Therapy:  Current Nutrition Therapy:   - Oral Diet:  Carb Control 4 carbs/meal (1800kcals/day)    Routes of Feeding: Oral  Liquids: Thin Liquids  Daily Fluid Restriction: no  Last Modified Barium Swallow with Video (Video Swallowing Test): not done    Treatments at the Time of Hospital Discharge:   Respiratory Treatments: ***  Oxygen Therapy:  is not on home oxygen therapy.   Ventilator:    - No ventilator support    Rehab Therapies: Physical Therapy and Occupational Therapy  Weight Bearing Status/Restrictions: No weight bearing restirctions  Other Medical Equipment (for information only, NOT a DME order):  {EQUIPMENT:[id number]}  Other Treatments: ***    Patient's personal belongings (please select all that are sent with patient):  None    RN SIGNATURE:  {Esignature:[id number]}    CASE MANAGEMENT/SOCIAL WORK SECTION    Inpatient Status Date: ***    Readmission Risk Assessment Score:  Readmission Risk              Risk of Unplanned Readmission:        20           Discharging to Facility/ Agency   · Name:   · Address:  · Phone:  · Fax:    Dialysis Facility (if applicable)   · Name:  · Address:  · Dialysis Schedule:  · Phone:  · Fax:    / signature: {Esignature:[id number]}    PHYSICIAN SECTION    Prognosis: Good    Condition at Discharge: Stable    Rehab Potential (if transferring to Rehab): Good    Recommended Labs or Other Treatments After Discharge: no    Physician Certification: I certify the above information and transfer of Olya Oscar  is necessary for the continuing treatment of the diagnosis listed and that he requires [facility] for greater 30 days. Update Admission H&P: no change in HP. His BG was low at some point. His insulin regimen was changed as follow: Lantus 15U twice a day, pre-meal Humalog 5U and low sliding scale.       PHYSICIAN SIGNATURE:  Electronically signed by Digna Regalado MD on 8/7/20 at 12:57 PM EDT

## 2020-08-07 NOTE — CARE COORDINATION
Discharge to [facility] arranged for 1900. Facility notified. They do not have transport available so they called the spouse and she will be here at 1900 to pick patient up and transport him to the facility. COVID (-) repeated today because it was out of the seven day window. For questions I can be reached at [phone].  Lavern English [place]

## 2020-08-07 NOTE — PLAN OF CARE
Problem: Falls - Risk of:  Goal: Will remain free from falls  Description: Will remain free from falls  Outcome: Met This Shift  Goal: Absence of physical injury  Description: Absence of physical injury  Outcome: Met This Shift     Problem: Skin Integrity:  Goal: Will show no infection signs and symptoms  Description: Will show no infection signs and symptoms  Outcome: Met This Shift  Goal: Absence of new skin breakdown  Description: Absence of new skin breakdown  Outcome: Met This Shift     Problem: Pain:  Description: Pain management should include both nonpharmacologic and pharmacologic interventions.   Goal: Pain level will decrease  Description: Pain level will decrease  Outcome: Met This Shift  Goal: Control of acute pain  Description: Control of acute pain  Outcome: Met This Shift     Problem: Musculor/Skeletal Functional Status  Goal: Highest potential functional level  Outcome: Met This Shift  Goal: Absence of falls  Outcome: Met This Shift

## 2020-08-07 NOTE — PROGRESS NOTES
Nurse to nurse given to Kait at [facility], informed them that wife will be picking patient up at 1900 to transport back to facility. Discharge AVS/TAM and [facility] report faxed to [phone].

## 2020-08-07 NOTE — CARE COORDINATION
Plan at discharge remains [address] [facility]. No precert needed to return. COVID completed on 7/31 (-). Ambulette form on soft chart. CM will follow.   Jayne Seals RN  [facility] Case Management  [phone]

## 2020-08-07 NOTE — PROGRESS NOTES
[facility]  Internal Medicine Residency / [address]    Attending Physician Statement  I have discussed the case, including pertinent history and exam findings with the resident and the team.  I have seen and examined the patient, reviewed meds and pertinent labs and the key elements of the encounter have been performed by me. I agree with the assessment, plan and orders as documented by the resident. Patient states he feels good, no further epistaxis, Hb 8.9 and 8.6. Platelets continue to slowly increase 82,000. Ok to discharge. Remainder of medical problems as per resident note.       [facility]  Internal Medicine Residency Faculty

## 2024-10-12 NOTE — CARE COORDINATION
Pt from [facility]. His wife confirmed plan is to return there at discharge. COVID completed on 7/31 (-). Ambulette form on soft chart. IV Cipro q 12 continues. Lactulose continues. CM will follow.   Griselda Kussmaul, RN  [facility] Case Management  [phone] ([phone]) obeys commands